# Patient Record
Sex: MALE | Race: WHITE | NOT HISPANIC OR LATINO | Employment: OTHER | URBAN - METROPOLITAN AREA
[De-identification: names, ages, dates, MRNs, and addresses within clinical notes are randomized per-mention and may not be internally consistent; named-entity substitution may affect disease eponyms.]

---

## 2017-05-22 ENCOUNTER — AMBULATORY - HEALTHEAST (OUTPATIENT)
Dept: INTERNAL MEDICINE | Facility: CLINIC | Age: 62
End: 2017-05-22

## 2017-05-22 ENCOUNTER — COMMUNICATION - HEALTHEAST (OUTPATIENT)
Dept: INTERNAL MEDICINE | Facility: CLINIC | Age: 62
End: 2017-05-22

## 2017-05-22 DIAGNOSIS — Z12.11 SCREEN FOR COLON CANCER: ICD-10-CM

## 2017-06-27 ENCOUNTER — RECORDS - HEALTHEAST (OUTPATIENT)
Dept: ADMINISTRATIVE | Facility: OTHER | Age: 62
End: 2017-06-27

## 2017-06-28 ENCOUNTER — RECORDS - HEALTHEAST (OUTPATIENT)
Dept: ADMINISTRATIVE | Facility: OTHER | Age: 62
End: 2017-06-28

## 2017-07-03 ENCOUNTER — COMMUNICATION - HEALTHEAST (OUTPATIENT)
Dept: INTERNAL MEDICINE | Facility: CLINIC | Age: 62
End: 2017-07-03

## 2017-07-03 ENCOUNTER — SURGERY - HEALTHEAST (OUTPATIENT)
Dept: GASTROENTEROLOGY | Facility: CLINIC | Age: 62
End: 2017-07-03

## 2017-07-03 ASSESSMENT — MIFFLIN-ST. JEOR
SCORE: 1544.12
SCORE: 1551.49

## 2017-07-04 ASSESSMENT — MIFFLIN-ST. JEOR: SCORE: 1560.57

## 2017-07-05 ENCOUNTER — COMMUNICATION - HEALTHEAST (OUTPATIENT)
Dept: INTERNAL MEDICINE | Facility: CLINIC | Age: 62
End: 2017-07-05

## 2017-07-10 ENCOUNTER — COMMUNICATION - HEALTHEAST (OUTPATIENT)
Dept: INTERNAL MEDICINE | Facility: CLINIC | Age: 62
End: 2017-07-10

## 2017-07-10 ENCOUNTER — OFFICE VISIT - HEALTHEAST (OUTPATIENT)
Dept: INTERNAL MEDICINE | Facility: CLINIC | Age: 62
End: 2017-07-10

## 2017-07-10 DIAGNOSIS — K92.2 LOWER GI BLEED: ICD-10-CM

## 2017-07-10 ASSESSMENT — MIFFLIN-ST. JEOR: SCORE: 1565.28

## 2017-08-14 ENCOUNTER — OFFICE VISIT - HEALTHEAST (OUTPATIENT)
Dept: INTERNAL MEDICINE | Facility: CLINIC | Age: 62
End: 2017-08-14

## 2017-08-14 ENCOUNTER — COMMUNICATION - HEALTHEAST (OUTPATIENT)
Dept: INTERNAL MEDICINE | Facility: CLINIC | Age: 62
End: 2017-08-14

## 2017-08-14 DIAGNOSIS — D62 ACUTE BLOOD LOSS ANEMIA: ICD-10-CM

## 2017-08-14 ASSESSMENT — MIFFLIN-ST. JEOR: SCORE: 1574.17

## 2017-08-22 ENCOUNTER — COMMUNICATION - HEALTHEAST (OUTPATIENT)
Dept: INTERNAL MEDICINE | Facility: CLINIC | Age: 62
End: 2017-08-22

## 2017-08-22 ENCOUNTER — AMBULATORY - HEALTHEAST (OUTPATIENT)
Dept: INTERNAL MEDICINE | Facility: CLINIC | Age: 62
End: 2017-08-22

## 2017-08-22 DIAGNOSIS — M54.9 BACK PAIN: ICD-10-CM

## 2017-08-28 ENCOUNTER — RECORDS - HEALTHEAST (OUTPATIENT)
Dept: ADMINISTRATIVE | Facility: OTHER | Age: 62
End: 2017-08-28

## 2017-08-28 ENCOUNTER — HOSPITAL ENCOUNTER (OUTPATIENT)
Dept: PHYSICAL MEDICINE AND REHAB | Facility: CLINIC | Age: 62
Discharge: HOME | End: 2017-08-28
Attending: INTERNAL MEDICINE

## 2017-08-28 DIAGNOSIS — Z98.890 HISTORY OF LUMBAR LAMINECTOMY: ICD-10-CM

## 2017-08-28 DIAGNOSIS — M54.50 LUMBALGIA: ICD-10-CM

## 2017-08-28 DIAGNOSIS — M79.18 MYOFASCIAL PAIN: ICD-10-CM

## 2017-08-28 ASSESSMENT — MIFFLIN-ST. JEOR: SCORE: 1571.91

## 2017-09-08 ENCOUNTER — RECORDS - HEALTHEAST (OUTPATIENT)
Dept: ADMINISTRATIVE | Facility: OTHER | Age: 62
End: 2017-09-08

## 2017-09-14 ENCOUNTER — OFFICE VISIT - HEALTHEAST (OUTPATIENT)
Dept: INTERNAL MEDICINE | Facility: CLINIC | Age: 62
End: 2017-09-14

## 2017-09-14 DIAGNOSIS — I10 ESSENTIAL HYPERTENSION WITH GOAL BLOOD PRESSURE LESS THAN 140/90: ICD-10-CM

## 2017-09-14 DIAGNOSIS — Z00.00 ROUTINE GENERAL MEDICAL EXAMINATION AT A HEALTH CARE FACILITY: ICD-10-CM

## 2017-09-14 DIAGNOSIS — R01.1 HEART MURMUR: ICD-10-CM

## 2017-09-14 LAB
CHOLEST SERPL-MCNC: 188 MG/DL
FASTING STATUS PATIENT QL REPORTED: YES
HDLC SERPL-MCNC: 62 MG/DL
LDLC SERPL CALC-MCNC: 117 MG/DL
PSA SERPL-MCNC: 3 NG/ML (ref 0–4.5)
TRIGL SERPL-MCNC: 45 MG/DL

## 2017-09-14 ASSESSMENT — MIFFLIN-ST. JEOR: SCORE: 1553.76

## 2017-09-15 ENCOUNTER — COMMUNICATION - HEALTHEAST (OUTPATIENT)
Dept: INTERNAL MEDICINE | Facility: CLINIC | Age: 62
End: 2017-09-15

## 2017-09-21 ENCOUNTER — OFFICE VISIT - HEALTHEAST (OUTPATIENT)
Dept: CARDIOLOGY | Facility: CLINIC | Age: 62
End: 2017-09-21

## 2017-09-21 DIAGNOSIS — I35.0 NONRHEUMATIC AORTIC VALVE STENOSIS: ICD-10-CM

## 2017-09-21 DIAGNOSIS — I10 HTN (HYPERTENSION): ICD-10-CM

## 2017-09-21 ASSESSMENT — MIFFLIN-ST. JEOR: SCORE: 1558.29

## 2017-09-28 ENCOUNTER — HOSPITAL ENCOUNTER (OUTPATIENT)
Dept: CARDIOLOGY | Facility: CLINIC | Age: 62
Discharge: HOME | End: 2017-09-28
Attending: INTERNAL MEDICINE

## 2017-09-28 DIAGNOSIS — I35.0 NONRHEUMATIC AORTIC VALVE STENOSIS: ICD-10-CM

## 2017-09-28 LAB
AORTIC ROOT: 3.2 CM
AORTIC VALVE MEAN VELOCITY: 152 CM/S
AV DIMENSIONLESS INDEX VTI: 0.6
AV MEAN GRADIENT: 10 MMHG
AV PEAK GRADIENT: 19.5 MMHG
AV VALVE AREA: 1.8 CM2
AV VELOCITY RATIO: 0.6
BSA FOR ECHO PROCEDURE: 1.95 M2
CV ECHO HEIGHT: 68 IN
CV ECHO WEIGHT: 175 LBS
DOP CALC AO PEAK VEL: 221 CM/S
DOP CALC AO VTI: 52.1 CM
DOP CALC LVOT AREA: 2.83 CM2
DOP CALC LVOT DIAMETER: 1.9 CM
DOP CALC LVOT PEAK VEL: 136 CM/S
DOP CALC LVOT STROKE VOLUME: 91.5 CM3
DOP CALCLVOT PEAK VEL VTI: 32.3 CM
ECHO EJECTION FRACTION ESTIMATED: 70 %
EJECTION FRACTION: 65 % (ref 55–75)
FRACTIONAL SHORTENING: 44.4 % (ref 28–44)
INTERVENTRICULAR SEPTUM IN END DIASTOLE: 1.2 CM (ref 0.6–1)
IVS/PW RATIO: 1
LA AREA 1: 18.2 CM2
LA AREA 2: 26 CM2
LEFT ATRIUM LENGTH: 4.78 CM
LEFT ATRIUM SIZE: 3.7 CM
LEFT ATRIUM TO AORTIC ROOT RATIO: 1.16 NO UNITS
LEFT ATRIUM VOLUME INDEX: 43.2 ML/M2
LEFT ATRIUM VOLUME: 84.1 CM3
LEFT VENTRICLE CARDIAC INDEX: 3.3 L/MIN/M2
LEFT VENTRICLE CARDIAC OUTPUT: 6.4 L/MIN
LEFT VENTRICLE DIASTOLIC VOLUME INDEX: 43.6 CM3/M2 (ref 34–74)
LEFT VENTRICLE DIASTOLIC VOLUME: 85 CM3 (ref 62–150)
LEFT VENTRICLE HEART RATE: 70 BPM
LEFT VENTRICLE MASS INDEX: 101.6 G/M2
LEFT VENTRICLE SYSTOLIC VOLUME INDEX: 15.4 CM3/M2 (ref 11–31)
LEFT VENTRICLE SYSTOLIC VOLUME: 30 CM3 (ref 21–61)
LEFT VENTRICULAR INTERNAL DIMENSION IN DIASTOLE: 4.5 CM (ref 4.2–5.8)
LEFT VENTRICULAR INTERNAL DIMENSION IN SYSTOLE: 2.5 CM (ref 2.5–4)
LEFT VENTRICULAR MASS: 198.1 G
LEFT VENTRICULAR OUTFLOW TRACT MEAN GRADIENT: 4 MMHG
LEFT VENTRICULAR OUTFLOW TRACT MEAN VELOCITY: 92.2 CM/S
LEFT VENTRICULAR OUTFLOW TRACT PEAK GRADIENT: 7 MMHG
LEFT VENTRICULAR POSTERIOR WALL IN END DIASTOLE: 1.2 CM (ref 0.6–1)
LV STROKE VOLUME INDEX: 46.9 ML/M2
MITRAL VALVE E/A RATIO: 0.9
MV AVERAGE E/E' RATIO: 10.7 CM/S
MV DECELERATION TIME: 190 MS
MV E'TISSUE VEL-LAT: 7.6 CM/S
MV E'TISSUE VEL-MED: 7.31 CM/S
MV LATERAL E/E' RATIO: 10.5
MV MEDIAL E/E' RATIO: 10.9
MV PEAK A VELOCITY: 92.3 CM/S
MV PEAK E VELOCITY: 79.5 CM/S
NUC REST DIASTOLIC VOLUME INDEX: 2800 LBS
NUC REST SYSTOLIC VOLUME INDEX: 68 IN
RIGHT VENTRICULAR INTERNAL DIMENSION IN DYSTOLE: 3.4 CM
TRICUSPID VALVE ANULAR PLANE SYSTOLIC EXCURSION: 2 CM

## 2017-09-28 ASSESSMENT — MIFFLIN-ST. JEOR: SCORE: 1558.29

## 2017-09-29 ENCOUNTER — COMMUNICATION - HEALTHEAST (OUTPATIENT)
Dept: CARDIOLOGY | Facility: CLINIC | Age: 62
End: 2017-09-29

## 2017-09-29 ENCOUNTER — COMMUNICATION - HEALTHEAST (OUTPATIENT)
Dept: INTERNAL MEDICINE | Facility: CLINIC | Age: 62
End: 2017-09-29

## 2017-10-16 ENCOUNTER — OFFICE VISIT - HEALTHEAST (OUTPATIENT)
Dept: INTERNAL MEDICINE | Facility: CLINIC | Age: 62
End: 2017-10-16

## 2017-10-16 DIAGNOSIS — I10 ESSENTIAL HYPERTENSION: ICD-10-CM

## 2017-12-21 ENCOUNTER — OFFICE VISIT - HEALTHEAST (OUTPATIENT)
Dept: INTERNAL MEDICINE | Facility: CLINIC | Age: 62
End: 2017-12-21

## 2017-12-21 DIAGNOSIS — I10 ESSENTIAL HYPERTENSION: ICD-10-CM

## 2017-12-21 ASSESSMENT — MIFFLIN-ST. JEOR: SCORE: 1567.37

## 2018-01-22 ENCOUNTER — RECORDS - HEALTHEAST (OUTPATIENT)
Dept: ADMINISTRATIVE | Facility: OTHER | Age: 63
End: 2018-01-22

## 2018-07-10 ENCOUNTER — COMMUNICATION - HEALTHEAST (OUTPATIENT)
Dept: INTERNAL MEDICINE | Facility: CLINIC | Age: 63
End: 2018-07-10

## 2018-07-10 ENCOUNTER — OFFICE VISIT - HEALTHEAST (OUTPATIENT)
Dept: INTERNAL MEDICINE | Facility: CLINIC | Age: 63
End: 2018-07-10

## 2018-07-10 ENCOUNTER — HOSPITAL ENCOUNTER (OUTPATIENT)
Dept: MRI IMAGING | Facility: CLINIC | Age: 63
Discharge: HOME | End: 2018-07-10
Attending: INTERNAL MEDICINE

## 2018-07-10 DIAGNOSIS — R42 VERTIGO: ICD-10-CM

## 2018-07-10 DIAGNOSIS — I10 ESSENTIAL HYPERTENSION, BENIGN: ICD-10-CM

## 2018-07-10 DIAGNOSIS — G47.00 INSOMNIA: ICD-10-CM

## 2018-07-10 DIAGNOSIS — F43.21 GRIEF: ICD-10-CM

## 2018-07-10 DIAGNOSIS — Z79.899 HIGH RISK MEDICATION USE: ICD-10-CM

## 2018-07-10 DIAGNOSIS — I10 HTN (HYPERTENSION): ICD-10-CM

## 2018-07-10 LAB
ALBUMIN UR-MCNC: NEGATIVE MG/DL
ANION GAP SERPL CALCULATED.3IONS-SCNC: 12 MMOL/L (ref 5–18)
APPEARANCE UR: CLEAR
BILIRUB UR QL STRIP: NEGATIVE
BUN SERPL-MCNC: 18 MG/DL (ref 8–22)
CALCIUM SERPL-MCNC: 10 MG/DL (ref 8.5–10.5)
CHLORIDE BLD-SCNC: 107 MMOL/L (ref 98–107)
CO2 SERPL-SCNC: 24 MMOL/L (ref 22–31)
COLOR UR AUTO: YELLOW
CREAT SERPL-MCNC: 0.78 MG/DL (ref 0.7–1.3)
GFR SERPL CREATININE-BSD FRML MDRD: >60 ML/MIN/1.73M2
GLUCOSE BLD-MCNC: 82 MG/DL (ref 70–125)
GLUCOSE UR STRIP-MCNC: NEGATIVE MG/DL
HGB UR QL STRIP: NEGATIVE
KETONES UR STRIP-MCNC: NEGATIVE MG/DL
LEUKOCYTE ESTERASE UR QL STRIP: NEGATIVE
NITRATE UR QL: NEGATIVE
PH UR STRIP: 6.5 [PH] (ref 5–8)
POTASSIUM BLD-SCNC: 4.3 MMOL/L (ref 3.5–5)
SODIUM SERPL-SCNC: 143 MMOL/L (ref 136–145)
SP GR UR STRIP: 1.02 (ref 1–1.03)
UROBILINOGEN UR STRIP-ACNC: NORMAL

## 2018-07-10 ASSESSMENT — MIFFLIN-ST. JEOR: SCORE: 1562.83

## 2018-07-11 ENCOUNTER — COMMUNICATION - HEALTHEAST (OUTPATIENT)
Dept: INTERNAL MEDICINE | Facility: CLINIC | Age: 63
End: 2018-07-11

## 2018-07-16 ENCOUNTER — OFFICE VISIT - HEALTHEAST (OUTPATIENT)
Dept: INTERNAL MEDICINE | Facility: CLINIC | Age: 63
End: 2018-07-16

## 2018-07-16 DIAGNOSIS — I10 ESSENTIAL HYPERTENSION: ICD-10-CM

## 2018-07-16 ASSESSMENT — MIFFLIN-ST. JEOR: SCORE: 1544.69

## 2018-08-01 ENCOUNTER — COMMUNICATION - HEALTHEAST (OUTPATIENT)
Dept: INTERNAL MEDICINE | Facility: CLINIC | Age: 63
End: 2018-08-01

## 2018-08-01 DIAGNOSIS — I10 HTN (HYPERTENSION): ICD-10-CM

## 2018-08-17 ENCOUNTER — COMMUNICATION - HEALTHEAST (OUTPATIENT)
Dept: INTERNAL MEDICINE | Facility: CLINIC | Age: 63
End: 2018-08-17

## 2018-08-17 DIAGNOSIS — I10 HTN (HYPERTENSION): ICD-10-CM

## 2018-08-20 ENCOUNTER — OFFICE VISIT - HEALTHEAST (OUTPATIENT)
Dept: INTERNAL MEDICINE | Facility: CLINIC | Age: 63
End: 2018-08-20

## 2018-08-20 DIAGNOSIS — I10 ESSENTIAL HYPERTENSION: ICD-10-CM

## 2018-08-20 ASSESSMENT — MIFFLIN-ST. JEOR: SCORE: 1549.22

## 2018-09-14 ENCOUNTER — COMMUNICATION - HEALTHEAST (OUTPATIENT)
Dept: GENERAL RADIOLOGY | Facility: CLINIC | Age: 63
End: 2018-09-14

## 2018-09-17 ENCOUNTER — OFFICE VISIT - HEALTHEAST (OUTPATIENT)
Dept: INTERNAL MEDICINE | Facility: CLINIC | Age: 63
End: 2018-09-17

## 2018-09-17 DIAGNOSIS — Z00.00 ROUTINE GENERAL MEDICAL EXAMINATION AT A HEALTH CARE FACILITY: ICD-10-CM

## 2018-09-17 LAB
ALBUMIN SERPL-MCNC: 4.1 G/DL (ref 3.5–5)
ALBUMIN UR-MCNC: NEGATIVE MG/DL
ALP SERPL-CCNC: 73 U/L (ref 45–120)
ALT SERPL W P-5'-P-CCNC: 25 U/L (ref 0–45)
ANION GAP SERPL CALCULATED.3IONS-SCNC: 8 MMOL/L (ref 5–18)
APPEARANCE UR: ABNORMAL
AST SERPL W P-5'-P-CCNC: 21 U/L (ref 0–40)
BILIRUB SERPL-MCNC: 1.4 MG/DL (ref 0–1)
BILIRUB UR QL STRIP: NEGATIVE
BUN SERPL-MCNC: 16 MG/DL (ref 8–22)
CALCIUM SERPL-MCNC: 10 MG/DL (ref 8.5–10.5)
CHLORIDE BLD-SCNC: 104 MMOL/L (ref 98–107)
CHOLEST SERPL-MCNC: 188 MG/DL
CO2 SERPL-SCNC: 27 MMOL/L (ref 22–31)
COLOR UR AUTO: YELLOW
CREAT SERPL-MCNC: 0.79 MG/DL (ref 0.7–1.3)
ERYTHROCYTE [DISTWIDTH] IN BLOOD BY AUTOMATED COUNT: 11.6 % (ref 11–14.5)
FASTING STATUS PATIENT QL REPORTED: YES
GFR SERPL CREATININE-BSD FRML MDRD: >60 ML/MIN/1.73M2
GLUCOSE BLD-MCNC: 94 MG/DL (ref 70–125)
GLUCOSE UR STRIP-MCNC: NEGATIVE MG/DL
HCT VFR BLD AUTO: 44.6 % (ref 40–54)
HDLC SERPL-MCNC: 65 MG/DL
HGB BLD-MCNC: 15.3 G/DL (ref 14–18)
HGB UR QL STRIP: NEGATIVE
KETONES UR STRIP-MCNC: NEGATIVE MG/DL
LDLC SERPL CALC-MCNC: 111 MG/DL
LEUKOCYTE ESTERASE UR QL STRIP: NEGATIVE
MCH RBC QN AUTO: 33.1 PG (ref 27–34)
MCHC RBC AUTO-ENTMCNC: 34.3 G/DL (ref 32–36)
MCV RBC AUTO: 97 FL (ref 80–100)
NITRATE UR QL: NEGATIVE
PH UR STRIP: 7.5 [PH] (ref 5–8)
PLATELET # BLD AUTO: 158 THOU/UL (ref 140–440)
PMV BLD AUTO: 7.8 FL (ref 7–10)
POTASSIUM BLD-SCNC: 4.1 MMOL/L (ref 3.5–5)
PROT SERPL-MCNC: 7.2 G/DL (ref 6–8)
PSA SERPL-MCNC: 2.2 NG/ML (ref 0–4.5)
RBC # BLD AUTO: 4.62 MILL/UL (ref 4.4–6.2)
SODIUM SERPL-SCNC: 139 MMOL/L (ref 136–145)
SP GR UR STRIP: 1.01 (ref 1–1.03)
TRIGL SERPL-MCNC: 59 MG/DL
TSH SERPL DL<=0.005 MIU/L-ACNC: 1.15 UIU/ML (ref 0.3–5)
UROBILINOGEN UR STRIP-ACNC: ABNORMAL
WBC: 4.2 THOU/UL (ref 4–11)

## 2018-09-17 ASSESSMENT — MIFFLIN-ST. JEOR: SCORE: 1549.22

## 2018-09-18 ENCOUNTER — COMMUNICATION - HEALTHEAST (OUTPATIENT)
Dept: INTERNAL MEDICINE | Facility: CLINIC | Age: 63
End: 2018-09-18

## 2018-12-31 ENCOUNTER — COMMUNICATION - HEALTHEAST (OUTPATIENT)
Dept: INTERNAL MEDICINE | Facility: CLINIC | Age: 63
End: 2018-12-31

## 2019-03-01 ENCOUNTER — COMMUNICATION - HEALTHEAST (OUTPATIENT)
Dept: INTERNAL MEDICINE | Facility: CLINIC | Age: 64
End: 2019-03-01

## 2019-11-13 ENCOUNTER — COMMUNICATION - HEALTHEAST (OUTPATIENT)
Dept: INTERNAL MEDICINE | Facility: CLINIC | Age: 64
End: 2019-11-13

## 2019-11-13 DIAGNOSIS — I10 HTN (HYPERTENSION): ICD-10-CM

## 2019-12-30 ENCOUNTER — OFFICE VISIT - HEALTHEAST (OUTPATIENT)
Dept: INTERNAL MEDICINE | Facility: CLINIC | Age: 64
End: 2019-12-30

## 2019-12-30 DIAGNOSIS — Z00.00 ROUTINE GENERAL MEDICAL EXAMINATION AT A HEALTH CARE FACILITY: ICD-10-CM

## 2019-12-30 LAB
ALBUMIN SERPL-MCNC: 3.9 G/DL (ref 3.5–5)
ALBUMIN UR-MCNC: NEGATIVE MG/DL
ALP SERPL-CCNC: 97 U/L (ref 45–120)
ALT SERPL W P-5'-P-CCNC: 36 U/L (ref 0–45)
ANION GAP SERPL CALCULATED.3IONS-SCNC: 9 MMOL/L (ref 5–18)
APPEARANCE UR: CLEAR
AST SERPL W P-5'-P-CCNC: 28 U/L (ref 0–40)
BILIRUB SERPL-MCNC: 0.6 MG/DL (ref 0–1)
BILIRUB UR QL STRIP: NEGATIVE
BUN SERPL-MCNC: 18 MG/DL (ref 8–22)
CALCIUM SERPL-MCNC: 9.4 MG/DL (ref 8.5–10.5)
CHLORIDE BLD-SCNC: 104 MMOL/L (ref 98–107)
CHOLEST SERPL-MCNC: 158 MG/DL
CO2 SERPL-SCNC: 26 MMOL/L (ref 22–31)
COLOR UR AUTO: YELLOW
CREAT SERPL-MCNC: 0.74 MG/DL (ref 0.7–1.3)
ERYTHROCYTE [DISTWIDTH] IN BLOOD BY AUTOMATED COUNT: 11.2 % (ref 11–14.5)
FASTING STATUS PATIENT QL REPORTED: NORMAL
GFR SERPL CREATININE-BSD FRML MDRD: >60 ML/MIN/1.73M2
GLUCOSE BLD-MCNC: 90 MG/DL (ref 70–125)
GLUCOSE UR STRIP-MCNC: NEGATIVE MG/DL
HCT VFR BLD AUTO: 43.4 % (ref 40–54)
HDLC SERPL-MCNC: 41 MG/DL
HGB BLD-MCNC: 15.1 G/DL (ref 14–18)
HGB UR QL STRIP: NEGATIVE
KETONES UR STRIP-MCNC: NEGATIVE MG/DL
LDLC SERPL CALC-MCNC: 99 MG/DL
LEUKOCYTE ESTERASE UR QL STRIP: NEGATIVE
MCH RBC QN AUTO: 33.7 PG (ref 27–34)
MCHC RBC AUTO-ENTMCNC: 34.7 G/DL (ref 32–36)
MCV RBC AUTO: 97 FL (ref 80–100)
NITRATE UR QL: NEGATIVE
PH UR STRIP: 6 [PH] (ref 4.5–8)
PLATELET # BLD AUTO: 157 THOU/UL (ref 140–440)
PMV BLD AUTO: 7.8 FL (ref 7–10)
POTASSIUM BLD-SCNC: 4.4 MMOL/L (ref 3.5–5)
PROT SERPL-MCNC: 6.9 G/DL (ref 6–8)
PSA SERPL-MCNC: 3.4 NG/ML (ref 0–4.5)
RBC # BLD AUTO: 4.48 MILL/UL (ref 4.4–6.2)
SODIUM SERPL-SCNC: 139 MMOL/L (ref 136–145)
SP GR UR STRIP: 1.02 (ref 1–1.03)
TRIGL SERPL-MCNC: 92 MG/DL
TSH SERPL DL<=0.005 MIU/L-ACNC: 1.43 UIU/ML (ref 0.3–5)
UROBILINOGEN UR STRIP-ACNC: NORMAL
WBC: 5.6 THOU/UL (ref 4–11)

## 2019-12-30 ASSESSMENT — MIFFLIN-ST. JEOR: SCORE: 1518.04

## 2019-12-31 ENCOUNTER — COMMUNICATION - HEALTHEAST (OUTPATIENT)
Dept: INTERNAL MEDICINE | Facility: CLINIC | Age: 64
End: 2019-12-31

## 2020-01-21 ENCOUNTER — RECORDS - HEALTHEAST (OUTPATIENT)
Dept: ADMINISTRATIVE | Facility: OTHER | Age: 65
End: 2020-01-21

## 2020-02-10 ENCOUNTER — COMMUNICATION - HEALTHEAST (OUTPATIENT)
Dept: INTERNAL MEDICINE | Facility: CLINIC | Age: 65
End: 2020-02-10

## 2020-02-10 DIAGNOSIS — I10 HTN (HYPERTENSION): ICD-10-CM

## 2020-02-13 ENCOUNTER — RECORDS - HEALTHEAST (OUTPATIENT)
Dept: ADMINISTRATIVE | Facility: OTHER | Age: 65
End: 2020-02-13

## 2020-06-29 ENCOUNTER — RECORDS - HEALTHEAST (OUTPATIENT)
Dept: ADMINISTRATIVE | Facility: OTHER | Age: 65
End: 2020-06-29

## 2021-04-20 ENCOUNTER — OFFICE VISIT - HEALTHEAST (OUTPATIENT)
Dept: INTERNAL MEDICINE | Facility: CLINIC | Age: 66
End: 2021-04-20

## 2021-04-20 DIAGNOSIS — Z00.00 ROUTINE GENERAL MEDICAL EXAMINATION AT A HEALTH CARE FACILITY: ICD-10-CM

## 2021-04-20 LAB
ALBUMIN SERPL-MCNC: 4.2 G/DL (ref 3.5–5)
ALBUMIN UR-MCNC: NEGATIVE G/DL
ALP SERPL-CCNC: 84 U/L (ref 45–120)
ALT SERPL W P-5'-P-CCNC: 17 U/L (ref 0–45)
ANION GAP SERPL CALCULATED.3IONS-SCNC: 10 MMOL/L (ref 5–18)
APPEARANCE UR: CLEAR
AST SERPL W P-5'-P-CCNC: 19 U/L (ref 0–40)
BILIRUB SERPL-MCNC: 0.7 MG/DL (ref 0–1)
BILIRUB UR QL STRIP: NEGATIVE
BUN SERPL-MCNC: 20 MG/DL (ref 8–22)
CALCIUM SERPL-MCNC: 9.7 MG/DL (ref 8.5–10.5)
CHLORIDE BLD-SCNC: 102 MMOL/L (ref 98–107)
CHOLEST SERPL-MCNC: 210 MG/DL
CO2 SERPL-SCNC: 27 MMOL/L (ref 22–31)
COLOR UR AUTO: YELLOW
CREAT SERPL-MCNC: 0.78 MG/DL (ref 0.7–1.3)
ERYTHROCYTE [DISTWIDTH] IN BLOOD BY AUTOMATED COUNT: 11.7 % (ref 11–14.5)
FASTING STATUS PATIENT QL REPORTED: YES
GFR SERPL CREATININE-BSD FRML MDRD: >60 ML/MIN/1.73M2
GLUCOSE BLD-MCNC: 96 MG/DL (ref 70–125)
GLUCOSE UR STRIP-MCNC: NEGATIVE MG/DL
HCT VFR BLD AUTO: 42.9 % (ref 40–54)
HDLC SERPL-MCNC: 59 MG/DL
HGB BLD-MCNC: 15 G/DL (ref 14–18)
HGB UR QL STRIP: NEGATIVE
KETONES UR STRIP-MCNC: NEGATIVE MG/DL
LDLC SERPL CALC-MCNC: 136 MG/DL
LEUKOCYTE ESTERASE UR QL STRIP: NEGATIVE
MCH RBC QN AUTO: 32.7 PG (ref 27–34)
MCHC RBC AUTO-ENTMCNC: 35 G/DL (ref 32–36)
MCV RBC AUTO: 94 FL (ref 80–100)
NITRATE UR QL: NEGATIVE
PH UR STRIP: 7 [PH] (ref 5–8)
PLATELET # BLD AUTO: 173 THOU/UL (ref 140–440)
PMV BLD AUTO: 9.6 FL (ref 7–10)
POTASSIUM BLD-SCNC: 4.5 MMOL/L (ref 3.5–5)
PROT SERPL-MCNC: 7.2 G/DL (ref 6–8)
PSA SERPL-MCNC: 3.3 NG/ML (ref 0–4.5)
RBC # BLD AUTO: 4.59 MILL/UL (ref 4.4–6.2)
SODIUM SERPL-SCNC: 139 MMOL/L (ref 136–145)
SP GR UR STRIP: 1.02 (ref 1–1.03)
TRIGL SERPL-MCNC: 74 MG/DL
TSH SERPL DL<=0.005 MIU/L-ACNC: 1.1 UIU/ML (ref 0.3–5)
UROBILINOGEN UR STRIP-ACNC: NORMAL
WBC: 4.7 THOU/UL (ref 4–11)

## 2021-04-20 ASSESSMENT — MIFFLIN-ST. JEOR: SCORE: 1536.18

## 2021-04-22 ENCOUNTER — COMMUNICATION - HEALTHEAST (OUTPATIENT)
Dept: INTERNAL MEDICINE | Facility: CLINIC | Age: 66
End: 2021-04-22

## 2021-04-22 DIAGNOSIS — E88.810 METABOLIC SYNDROME: ICD-10-CM

## 2021-05-10 ENCOUNTER — RECORDS - HEALTHEAST (OUTPATIENT)
Dept: ADMINISTRATIVE | Facility: OTHER | Age: 66
End: 2021-05-10

## 2021-05-10 ENCOUNTER — COMMUNICATION - HEALTHEAST (OUTPATIENT)
Dept: INTERNAL MEDICINE | Facility: CLINIC | Age: 66
End: 2021-05-10

## 2021-05-10 DIAGNOSIS — I10 HTN (HYPERTENSION): ICD-10-CM

## 2021-05-26 ENCOUNTER — RECORDS - HEALTHEAST (OUTPATIENT)
Dept: ADMINISTRATIVE | Facility: CLINIC | Age: 66
End: 2021-05-26

## 2021-05-31 VITALS — BODY MASS INDEX: 26.52 KG/M2 | HEIGHT: 68 IN | WEIGHT: 175 LBS

## 2021-05-31 VITALS — BODY MASS INDEX: 25.92 KG/M2 | HEIGHT: 69 IN | WEIGHT: 175 LBS

## 2021-05-31 VITALS — BODY MASS INDEX: 26.37 KG/M2 | WEIGHT: 174 LBS | HEIGHT: 68 IN

## 2021-05-31 VITALS — HEIGHT: 69 IN | BODY MASS INDEX: 25.84 KG/M2 | WEIGHT: 174.5 LBS

## 2021-05-31 VITALS — WEIGHT: 177 LBS | HEIGHT: 68 IN | BODY MASS INDEX: 26.83 KG/M2

## 2021-05-31 VITALS — WEIGHT: 173.04 LBS | HEIGHT: 69 IN | BODY MASS INDEX: 25.63 KG/M2

## 2021-05-31 VITALS — HEIGHT: 68 IN | WEIGHT: 175 LBS | BODY MASS INDEX: 26.52 KG/M2

## 2021-05-31 VITALS — WEIGHT: 172 LBS | BODY MASS INDEX: 25.48 KG/M2 | HEIGHT: 69 IN

## 2021-06-01 VITALS — BODY MASS INDEX: 26.22 KG/M2 | HEIGHT: 68 IN | WEIGHT: 173 LBS

## 2021-06-01 VITALS — WEIGHT: 172 LBS | BODY MASS INDEX: 26.07 KG/M2 | HEIGHT: 68 IN

## 2021-06-01 VITALS — WEIGHT: 176 LBS | BODY MASS INDEX: 26.67 KG/M2 | HEIGHT: 68 IN

## 2021-06-01 VITALS — WEIGHT: 173 LBS | HEIGHT: 68 IN | BODY MASS INDEX: 26.22 KG/M2

## 2021-06-03 VITALS
OXYGEN SATURATION: 98 % | WEIGHT: 167 LBS | DIASTOLIC BLOOD PRESSURE: 70 MMHG | HEART RATE: 74 BPM | SYSTOLIC BLOOD PRESSURE: 120 MMHG | HEIGHT: 68 IN | BODY MASS INDEX: 25.31 KG/M2

## 2021-06-03 NOTE — TELEPHONE ENCOUNTER
Refill NOT Given  Refill NOT Given> 15 months since last office visit  Refill given per Policy, patient informed they are overdue for Labs and Office Visit   OV 9/17/18    Joann Chaparro, [facility] Connection Triage/Med Refill 11/13/2019    Requested Prescriptions   Pending Prescriptions Disp Refills     lisinopril (PRINIVIL,ZESTRIL) 10 MG tablet [Pharmacy Med Name: LISINOPRIL 10MG TABLETS] 180 tablet 0     Sig: TAKE 2 TABLETS(20 MG) BY MOUTH DAILY       Ace Inhibitors Refill Protocol Failed - 11/13/2019  3:56 PM        Failed - PCP or prescribing provider visit in past 12 months       Last office visit with prescriber/PCP: 8/20/2018 Vinh Rdz MD OR same dept: Visit date not found OR same specialty: 8/20/2018 Vinh Rdz MD  Last physical: 9/17/2018 Last MTM visit: Visit date not found   Next visit within 3 mo: Visit date not found  Next physical within 3 mo: Visit date not found  Prescriber OR PCP: Vinh Rdz MD  Last diagnosis associated with med order: 1. HTN (hypertension)  - lisinopril (PRINIVIL,ZESTRIL) 10 MG tablet [Pharmacy Med Name: LISINOPRIL 10MG TABLETS]; TAKE 2 TABLETS(20 MG) BY MOUTH DAILY  Dispense: 180 tablet; Refill: 0    If protocol passes may refill for 12 months if within 3 months of last provider visit (or a total of 15 months).             Failed - Serum Potassium in past 12 months     No results found for: LN-POTASSIUM          Failed - Blood pressure filed in past 12 months     BP Readings from Last 1 Encounters:   09/17/18 138/72             Failed - Serum Creatinine in past 12 months     Creatinine   Date Value Ref Range Status   09/17/2018 0.79 0.70 - 1.30 mg/dL Final

## 2021-06-04 NOTE — PROGRESS NOTES
Office Visit - Physical    Yayo Larkin   64 y.o. male    Date of Visit: 12/30/2019    Chief Complaint   Patient presents with     Annual Exam     fasting       Subjective: Physical examination.    64-year-old semiretired  here for physical exam.    Colonoscopy dated June 27, 2017 showed a tubular adenomatous colon polyp.  Repeat colonoscopy slated for 2020.    No known drug allergies.    Non-smoker no excess alcohol.    Deviously seen at the [place] [facility] for eye irritation resolved.    ROS: A comprehensive review of systems was performed and was otherwise negative    Medications:   Prior to Admission medications    Medication Sig Start Date End Date Taking? Authorizing Provider   aspirin 81 mg chewable tablet Chew 81 mg daily.   Yes PROVIDER, HISTORICAL   DOCOSAHEXANOIC ACID/EPA (FISH OIL ORAL) Take 1,200 mg by mouth daily.   Yes PROVIDER, HISTORICAL   glucosamine-chondroitin 500-400 mg cap Take 1 capsule by mouth daily.   Yes PROVIDER, HISTORICAL   lisinopril (PRINIVIL,ZESTRIL) 10 MG tablet TAKE 2 TABLETS(20 MG) BY MOUTH DAILY 11/14/19  Yes Vinh Rdz MD   multivitamin with minerals (THERA-M) 9 mg iron-400 mcg Tab tablet Take 1 tablet by mouth daily.   Yes PROVIDER, HISTORICAL       Allergies:No Known Allergies    Immunizations:   Immunization History   Administered Date(s) Administered     DT (pediatric) 07/11/2005     Influenza, inj, historic,unspecified 10/29/2008, 11/24/2014     Influenza,seasonal quad, PF, =/> 6months 10/16/2017     Td,adult,historic,unspecified 07/11/2005     Tdap 02/25/2010     ZOSTER, LIVE 10/09/2017       Health Maintenance: Immunizations reviewed and up-to-date.    Shingles vaccine has been recommended along with flu shot.    Past Medical History: Malignant melanoma resected no sign of recurrence.    History of lumbar laminectomy for free fragment.    Past Surgical History: As above no sign of recurrence from malignant melanoma no anesthetic problems  with any prior surgery.    Family History: Mother living in her late 80s.    Father  in his mid 50s myocardial infarction.  One sister  in her 20s of rhabdomyosarcoma.  Wife  about 2 years ago with pneumococcal sepsis.  2017.  Patient has a female friend.  Semi-retired now.  Anticipates going to [place] for the [employer] [place] game.    Social History: See above    Exam Chest clear to auscultation and percussion.  Heart tones regular rhythm without murmur rub or gallop.  Abdomen soft nontender no organomegaly.  No peritoneal signs.  Extremities free of edema cyanosis or clubbing.  Neck veins nondistended no thyromegaly or scleral icterus noted, carotids full.  Skin warm and dry easily conversant good spirited.  Normal intelligence.  Neurologically intact no gross localizing findings.  Skin negative lymph negative neuro negative psych normal male pattern baldness easily conversant excellent neuromuscular tone the patient was a former football and .  Genital rectal exam negative good pulse noted all 4 extremities no carotid bruits.    Assessment and Plan  General medical examination at healthcare facility check hemogram plus comprehensive metabolic profile urinalysis lipid panel PSA TSH.        Vinh Rdz MD    Patient Active Problem List   Diagnosis     Lumbago     Mixed hyperlipidemia     Lower GI bleed     S/P colonoscopic polypectomy with possible polypectomy bleed     Acute blood loss anemia     Essential hypertension, benign

## 2021-06-05 VITALS
WEIGHT: 171 LBS | DIASTOLIC BLOOD PRESSURE: 74 MMHG | BODY MASS INDEX: 25.91 KG/M2 | TEMPERATURE: 97.9 F | OXYGEN SATURATION: 99 % | HEIGHT: 68 IN | HEART RATE: 67 BPM | SYSTOLIC BLOOD PRESSURE: 130 MMHG

## 2021-06-06 NOTE — TELEPHONE ENCOUNTER
Who is calling:  Yayo  Reason for Call:  Patient is calling to check on status of refill request for 2/10/20.   Date of last appointment with primary care: 12/30/19  Okay to leave a detailed message: No need to call back.

## 2021-06-06 NOTE — TELEPHONE ENCOUNTER
Refill Approved    Rx renewed per Medication Renewal Policy. Medication was last renewed on 12.30.19.    Justina Mckoy, [facility] Connection Triage/Med Refill 2/13/2020     Requested Prescriptions   Pending Prescriptions Disp Refills     lisinopril (PRINIVIL,ZESTRIL) 10 MG tablet [Pharmacy Med Name: LISINOPRIL 10MG TABLETS] 180 tablet 0     Sig: TAKE 2 TABLETS(20 MG) BY MOUTH DAILY       Ace Inhibitors Refill Protocol Passed - 2/13/2020  8:36 AM        Passed - PCP or prescribing provider visit in past 12 months       Last office visit with prescriber/PCP: 8/20/2018 Vinh Rdz MD OR same dept: Visit date not found OR same specialty: 8/20/2018 Vinh Rdz MD  Last physical: 12/30/2019 Last MTM visit: Visit date not found   Next visit within 3 mo: Visit date not found  Next physical within 3 mo: Visit date not found  Prescriber OR PCP: Vinh Rdz MD  Last diagnosis associated with med order: 1. HTN (hypertension)  - lisinopril (PRINIVIL,ZESTRIL) 10 MG tablet [Pharmacy Med Name: LISINOPRIL 10MG TABLETS]; TAKE 2 TABLETS(20 MG) BY MOUTH DAILY  Dispense: 180 tablet; Refill: 0    If protocol passes may refill for 12 months if within 3 months of last provider visit (or a total of 15 months).             Passed - Serum Potassium in past 12 months     Lab Results   Component Value Date    Potassium 4.4 12/30/2019             Passed - Blood pressure filed in past 12 months     BP Readings from Last 1 Encounters:   12/30/19 120/70             Passed - Serum Creatinine in past 12 months     Creatinine   Date Value Ref Range Status   12/30/2019 0.74 0.70 - 1.30 mg/dL Final

## 2021-06-08 ENCOUNTER — RECORDS - HEALTHEAST (OUTPATIENT)
Dept: ADMINISTRATIVE | Facility: OTHER | Age: 66
End: 2021-06-08

## 2021-06-12 NOTE — PROGRESS NOTES
Office Visit - Follow up    Yayo Larkin   61 y.o. male    Date of Visit: 8/14/2017    Chief Complaint   Patient presents with     GI Bleeding     follow up     Back Pain     lwer seen at [facility]  yesterday        Subjective: Acute lower GI bleed.  Follow-up hemoglobin last on 8.9 on July 10, 2017 here today for recheck.    Low back pain went to triage yesterday orthopedic.  Given cyclobenzaprine 10 mg plus Medrol Dosepak.  Recheck hemoglobin levels pending.  No more hematochezia.  No blood in stool or urine no melena.    Denies chest pain or shortness of breath.  Medication list reviewed generally well-tolerated.    Last colonoscopy per Dr. Baker of [facility] on July on June 27, 2017 showed a tubular adenoma consistent with advanced adenoma follow-up suggested for colonoscopy 3 years time subsequent to this polypectomy the patient had acute lower GI bleed.    ROS: A comprehensive review of systems was performed and was otherwise negative    Medications:  Prior to Admission medications    Medication Sig Start Date End Date Taking? Authorizing Provider   ascorbic acid, vitamin C, (VITAMIN C) 500 MG tablet Take 500 mg by mouth daily.   Yes PROVIDER, HISTORICAL   aspirin 81 mg chewable tablet Chew 81 mg daily.   Yes PROVIDER, HISTORICAL   cholecalciferol, vitamin D3, (VITAMIN D3) 1,000 unit capsule Take 1,000 Units by mouth daily.   Yes PROVIDER, HISTORICAL   cyclobenzaprine (FLEXERIL) 10 MG tablet Take 10 mg by mouth. 8/13/17  Yes PROVIDER, HISTORICAL   DOCOSAHEXANOIC ACID/EPA (FISH OIL ORAL) Take 1,200 mg by mouth daily.   Yes PROVIDER, HISTORICAL   methylPREDNISolone (MEDROL DOSEPACK) 4 mg tablet  8/13/17  Yes PROVIDER, HISTORICAL   multivitamin with minerals (THERA-M) 9 mg iron-400 mcg Tab tablet Take 1 tablet by mouth daily.   Yes PROVIDER, HISTORICAL       Allergies: No Known Allergies    Immunizations:   Immunization History   Administered Date(s) Administered     DT (pediatric) 07/11/2005     Influenza,  inj, historic 10/29/2008     Td, historic 07/11/2005     Tdap 02/25/2010       Exam Chest clear to auscultation and percussion.  Heart tones regular rhythm without murmur rub or gallop.  Abdomen soft nontender no organomegaly.  No peritoneal signs.  Extremities free of edema cyanosis or clubbing.  Neck veins nondistended no thyromegaly or scleral icterus noted, carotids full.  Skin warm and dry easily conversant good spirited.  Normal intelligence.  Neurologically intact no gross localizing findings.  Slightly stooped in posture bent forward.  Lack of normal lordosis.    Assessment and Plan  Lower GI bleed after polypectomy for colon polyp tubular adenomatous in type advanced.  Check hemoglobin level today.  No further hematochezia.    Low back pain acute flareup sports\golf related.    Overweight BMI 26.  Discussed see below.  RTC 1 month for full physical examination with recheck hemoglobin.  Discussed iron rich diet.    Time: total time spent with the patient was 25 minutes of which >50% was spent in counseling and coordination of care    The following high BMI interventions were performed this visit: encouragement to exercise    Vinh Rdz MD    Patient Active Problem List   Diagnosis     Lumbago     Olecranon Bursitis     Mixed hyperlipidemia     General medical exam     Lower GI bleed     S/P colonoscopic polypectomy with possible polypectomy bleed     Acute blood loss anemia

## 2021-06-12 NOTE — PROGRESS NOTES
ASSESSMENT:     Diagnoses and all orders for this visit:    Lumbalgia  -     Ambulatory referral to Physical Therapy    Myofascial pain  -     Ambulatory referral to Physical Therapy    History of lumbar laminectomy  -     Ambulatory referral to Physical Therapy            Yayo Larkin is a 61 y.o. male  with a BMI of Body mass index is 25.77 kg/(m^2). who presents today in consultation at the request of Dr. Rdz, Vinh THOMAS MD  for new patient evaluation of right-sided mid to low back pain at the left L3-L4 level.  Patient symptoms are consistent with myofascial pain.  Patient symptoms are without radiation proximally or distally.  Patient symptoms started approximately 2 weeks ago without preceding trauma.  Patient has a history of right L3-L4 laminectomy back in 2007.  Patient symptoms responded well to  Medrol Dosepak and muscle relaxant and taking Advil as needed for pain.  I recommend patient to start in physical therapy, and to continue with the muscle relaxer as needed.  If his symptoms reappears and worsens, we will consider lumbar MRI imaging.  CT abdomen pelvis that was done on July 30, 2017 shows no concerning pathology except for the small amount of active GI bleed that was addressed in his recent hospitalization.  No red flags.          IMELDA:  24  WHO-5:  18    PLAN:  A shared decision making model was used.  The patient's values and choices were respected.  The following represents what was discussed and decided upon by the physician and the patient.      1.  DIAGNOSTIC TESTS: I reviewed the CT abdomen pelvis imaging and the report with the patient today.  2.  PHYSICAL THERAPY:  Discussed the importance of core strengthening, ROM, stretching exercises with the patient and how each of these entities is important in decreasing pain.  Explained to the patient that the purpose of physical therapy is to teach the patient a home exercise program.  These exercises need to be performed every day in  order to decrease pain and prevent future occurrences of pain.  Likened it to brushing one's teeth.  Patient will start on physical therapy - [facility] program at [facility].  3.  MEDICATIONS: Patient will continue on Flexeril 10 mg as needed for muscle spasm.  He will continue on Aleve as needed for pain.  4.  INTERVENTIONS: No therapeutic steroid injections at this time.   5.  PATIENT EDUCATION: I thoroughly discussed the plan with the patient today.  He verbalizes understanding and agrees with the plan.      FOLLOW-UP:   Patient will follow up with me in 6 weeks.  All questions were answered.      VÍCTOR Lomas, MPA-C          SUBJECTIVE:  Yayo Larkin  Is a 61 y.o. male who presents today for new patient evaluation of low back pain.  Today patient reports left-sided mid to low back pain.  This symptom has been present for the last 2 weeks.  Patient states that he woke up 2 weeks ago and he felt severe pain at the left L3 paraspinal muscle region.  He does not recall trauma to the lower back however he has been playing golf constantly this summer.  Patient reports history of right L3-L4 laminectomy back in 2007.  Patient reports no radiation of pain proximally or distally.  Patient reports significant improvement in his symptoms after starting on Medrol Dosepak 2 weeks ago.  He also has been taken Flexeril 10 mg and that seems to help with his symptoms.  At this time he reports only 2-3 out of 10 intensity pain in the left mid back.  His symptoms are aggravated by turning to the sides, bending over and rates it at 8 out of 10 intensity on its worse prior to taking the prednisone pack.  His symptoms are alleviated by taking Aleve 2 tablets as needed for pain.  Patient had CT abdomen pelvis without contrast on July 30, 2017 due to GI bleed that showed tiny nonobstructing right renal calculus.  There was a linear hyperdensity in the cecum that was concerning for a small amount of active GI bleed.  Patient stated  that he was in the hospital for 3 days due to the GI bleed.  The CT scan did not show any masses concerning for malignancy.Patient denies urinary or bowel incontinence, unintentional weight loss, saddle anesthesia, fever or chills, balance difficulties.      Medications:    Current Outpatient Prescriptions   Medication Sig Dispense Refill     ascorbic acid, vitamin C, (VITAMIN C) 500 MG tablet Take 500 mg by mouth daily.       aspirin 81 mg chewable tablet Chew 81 mg daily.       cholecalciferol, vitamin D3, (VITAMIN D3) 1,000 unit capsule Take 1,000 Units by mouth daily.       DOCOSAHEXANOIC ACID/EPA (FISH OIL ORAL) Take 1,200 mg by mouth daily.       multivitamin with minerals (THERA-M) 9 mg iron-400 mcg Tab tablet Take 1 tablet by mouth daily.       cyclobenzaprine (FLEXERIL) 10 MG tablet Take 10 mg by mouth.       No current facility-administered medications for this encounter.        Allergies: No Known Allergies    PMH:    Past Medical History:   Diagnosis Date     GI (gastrointestinal bleed)        PSH:    Past Surgical History:   Procedure Laterality Date     COLONOSCOPY N/A 7/3/2017    Procedure: COLONOSCOPY with NS:EPINEPHRINE INJECTION AND PLACEMENT OF 3 CLIPS;  Surgeon: Naren Mancia MD;  Location: [facility];  Service:      LAMINECTOMY  2007       Family History:  No family history on file.    Social History:   Social History     Social History     Marital status:      Spouse name: N/A     Number of children: N/A     Years of education: N/A     Occupational History     Not on file.     Social History Main Topics     Smoking status: Never Smoker     Smokeless tobacco: Never Used     Alcohol use 1.2 oz/week     2 Shots of liquor per week     Drug use: No     Sexual activity: Not on file     Other Topics Concern     Not on file     Social History Narrative       ROS: Left-sided mid to low back pain.  Specifically negative for bowel/bladder dysfunction, fevers,chills, appetite changes,  unexplained weight loss.   Otherwise 13 systems reviewed are negative.  Please see the patient's intake questionnaire from today for details.      OBJECTIVE:  PHYSICAL EXAMINATION:    CONSTITUTIONAL:  Vital signs as above.  No acute distress.  The patient is well nourished and well groomed.  PSYCHIATRIC:  The patient is awake, alert, oriented to person, place, time and answering questions appropriately with clear speech.    SKIN:  Skin over the face, bilateral lower extremities, and posterior torso is clean, dry, intact without rashes.    GAIT:  Gait is non-antalgic.  The patient is able to heel and toe walk without significant difficulty.    STANDING EXAMINATION: Patient has mild tenderness at the left L3-L4 paraspinal muscle.  MUSCLE STRENGTH:  The patient has 5/5 strength for the bilateral hip flexors, knee flexors/extensors, ankle dorsiflexors/plantar flexors, great toe extensors, ankle evertors/invertors.  NEUROLOGICAL:  2/4 patellar, medial hamstring, and achilles reflexes bilaterally.  Negative Babinski's bilaterally.  No ankle clonus bilaterally. Sensation to light touch is intact in the bilateral L4, L5, and S1 dermatomes.  VASCULAR:  2/4  pulses bilaterally.  Bilateral lower extremities are warm.  There is no pitting edema of the bilateral lower extremities.  ABDOMINAL:  Soft, non-distended, non-tender throughout all quadrants.  No pulsatile mass palpated in the left lower quadrant.  LYMPH NODES:  No palpable or tender inguinal/popliteal lymph nodes.  MUSCULOSKELETAL: Negative straight leg raise bilaterally.  Negative Yyao test and hip impingement bilaterally.      RESULTS:      CT ABDOMEN PELVIS WO ORAL W IV CONTRAST  7/3/2017 6:28 AM      INDICATION: GI bleed, colonoscopy  TECHNIQUE: CT abdomen and pelvis. Multiplanar reformation images (MPR). Dose reduction techniques were used.   IV CONTRAST: omni 350  100 mL  COMPARISON: 8/31/2011     FINDINGS:  LUNG BASES: Clear.     ABDOMEN: Liver, spleen,  pancreas and adrenals are within normal limits. 2 mm right renal calculus. Subcentimeter renal hypodensities small for characterization. Atherosclerotic aorta. Normal caliber bowel.     PELVIS: Prominently fluid-filled colon. Linear hyperdensity in the cecum, coronal image 56-61. The prostate is prominent. Prostate calcification. Normal caliber appendix.     MUSCULOSKELETAL: Degenerative change osseous structures.     IMPRESSION:   CONCLUSION:  1.  Linear hyperdensity in the cecum. This is concerning for a small amount of active extravasation/site of GI bleeding.  2.  Tiny nonobstructing right renal calculus.     3.  Results were called to Dr. Cheng Lee at 0698

## 2021-06-13 NOTE — PROGRESS NOTES
Office Visit - Follow up    Yayo Larkin   62 y.o. male    Date of Visit: 10/16/2017    Chief Complaint   Patient presents with     Hypertension       Subjective: Hypertension.  1 month follow-up hypertension plus new cardiac murmur.  Seen by cardiologist.  Dr. Kent.  Echocardiogram from September 21 reviewed.  Aortic valve was bicuspid mild global thickening with mild stenosis and trace regurgitation.  Left ventricular ejection fraction 70% borderline hypertrophy is noted.    No blood in stool or urine medication list reviewed.    Denies chest pain shortness of breath or exertional syncope.  No palpitations.    ROS: A comprehensive review of systems was performed and was otherwise negative    Medications:  Prior to Admission medications    Medication Sig Start Date End Date Taking? Authorizing Provider   ascorbic acid, vitamin C, (VITAMIN C) 500 MG tablet Take 500 mg by mouth daily.   Yes PROVIDER, HISTORICAL   aspirin 81 mg chewable tablet Chew 81 mg daily.   Yes PROVIDER, HISTORICAL   cholecalciferol, vitamin D3, (VITAMIN D3) 1,000 unit capsule Take 1,000 Units by mouth daily.   Yes PROVIDER, HISTORICAL   DOCOSAHEXANOIC ACID/EPA (FISH OIL ORAL) Take 1,200 mg by mouth daily.   Yes PROVIDER, HISTORICAL   lisinopril (PRINIVIL,ZESTRIL) 10 MG tablet Take 1 tablet (10 mg total) by mouth daily. 9/21/17  Yes Maricel Austin MD   multivitamin with minerals (THERA-M) 9 mg iron-400 mcg Tab tablet Take 1 tablet by mouth daily.    PROVIDER, HISTORICAL   cyclobenzaprine (FLEXERIL) 10 MG tablet Take 10 mg by mouth. 8/13/17 10/16/17  PROVIDER, HISTORICAL       Allergies: No Known Allergies    Immunizations:   Immunization History   Administered Date(s) Administered     DT (pediatric) 07/11/2005     Influenza, inj, historic 10/29/2008     Influenza,seasonal quad, PF, 36+MOS 10/16/2017     Td, historic 07/11/2005     Tdap 02/25/2010       Exam Chest clear to auscultation and percussion.  Heart tones regular rhythm without  murmur rub or gallop.  Abdomen soft nontender no organomegaly.  No peritoneal signs.  Extremities free of edema cyanosis or clubbing.  Neck veins nondistended no thyromegaly or scleral icterus noted, carotids full.  Skin warm and dry easily conversant good spirited.  Normal intelligence.  Neurologically intact no gross localizing findings.  Soft systolic murmur at the base.  No diastolic component no S3.  Rhythm is regular.  Asymptomatic in terms of mild aortic stenosis.  Bicuspid aortic valve mother had the same.    Assessment and Plan  Hypertension controlled.  Although labile outside readings reviewed.  Continue lisinopril 10 mg daily.    Bicuspid aortic valve with mild aortic stenosis asymptomatic at this juncture discussed primary symptoms of note include chest pain shortness of breath or exertional syncope.  None of late denies palpitations.  Recheck 2 months.    Left ventricular hypertrophy.  With aortic regurgitation mild.  Discussed SBE prophylaxis as well.  Zostavax given October 9, 2017.  Overview of various types of aortic valvular disease reviewed in detail with the patient.    Time: total time spent with the patient was 25 minutes of which >50% was spent in counseling and coordination of care    The following high BMI interventions were performed this visit: encouragement to exercise    Vinh Rdz MD    Patient Active Problem List   Diagnosis     Lumbago     Mixed hyperlipidemia     Lower GI bleed     S/P colonoscopic polypectomy with possible polypectomy bleed     Acute blood loss anemia

## 2021-06-13 NOTE — PROGRESS NOTES
[facility] Heart Care Note    Assessment / Plan:    Mr. Larkin was recently found to have a new cardiac murmur.  On exam today it appears that he has mild to moderate aortic stenosis, and will benefit from an echocardiogram for further evaluation.    The pathology and natural history of aortic stenosis was reviewed, and was also outlined that he likely has a bicuspid aortic valve, and if that is confirmed on echocardiography, his first-degree relatives should be screened as well.    Regarding his hypertension, he was given a prescription for lisinopril 10 mg daily, and advised to keep a log of his pressures.  It is very likely that he will need at least 2 antihypertensives for effective blood pressure management, and these can be started in a staged manner.      Thank you for the opportunity to participate in the care of Yayo Larkin. Please do not hesitate to call with any questions or concerns regarding his cardiovascular status.    ______________________________________________________________________    Subjective:    It was a pleasure to see Yayo Larkin at the [facility] at the request of Dr Rdz for evaluation of a new heart murmur as well as hypertension.     Yayo Larkin is a pleasant 62 y.o. male with with no prior cardiac history, who was recently seen for his annual physical by Dr. Rdz.  He did not have any specific complaints at that time, but was noted on exam to have a cardiac murmur, and was also noted to be quite hypertensive, with systolic blood pressures in the 190s.  He has had subsequent follow-up blood pressure checks which have been in the 150-160 systolic range.    Mr. Larkin continues to state that he feels well today.  He plays golf at least 3 times a week and walks the course without any shortness of breath.  He also states that climbing a flight of stairs is not a problem.  He also denies any chest  discomfort.  ______________________________________________________________________    Problem List:  Patient Active Problem List   Diagnosis     Lumbago     Olecranon Bursitis     Mixed hyperlipidemia     General medical exam     Lower GI bleed     S/P colonoscopic polypectomy with possible polypectomy bleed     Acute blood loss anemia       Medical History:  Past Medical History:   Diagnosis Date     GI (gastrointestinal bleed)        Surgical History:  Past Surgical History:   Procedure Laterality Date     COLONOSCOPY N/A 7/3/2017    Procedure: COLONOSCOPY with NS:EPINEPHRINE INJECTION AND PLACEMENT OF 3 CLIPS;  Surgeon: Naren Mancia MD;  Location: [facility];  Service:      LAMINECTOMY         Social History:  Social History     Social History     Marital status:      Spouse name: N/A     Number of children: N/A     Years of education: N/A     Occupational History     Not on file.     Social History Main Topics     Smoking status: Never Smoker     Smokeless tobacco: Never Used     Alcohol use 1.2 oz/week     2 Shots of liquor per week     Drug use: No     Sexual activity: Not on file     Other Topics Concern     Not on file     Social History Narrative       Review of Systems: 12 organ system review done and negative except as noted in the HPI.    Family History:  Father  of an MI at 51, brother had coronary stents recently, and mother needed valve replacement in her 70s    Allergies:  No Known Allergies    Medications:  Current Outpatient Prescriptions   Medication Sig Dispense Refill     ascorbic acid, vitamin C, (VITAMIN C) 500 MG tablet Take 500 mg by mouth daily.       aspirin 81 mg chewable tablet Chew 81 mg daily.       cholecalciferol, vitamin D3, (VITAMIN D3) 1,000 unit capsule Take 1,000 Units by mouth daily.       cyclobenzaprine (FLEXERIL) 10 MG tablet Take 10 mg by mouth.       DOCOSAHEXANOIC ACID/EPA (FISH OIL ORAL) Take 1,200 mg by mouth daily.       multivitamin with  "minerals (THERA-M) 9 mg iron-400 mcg Tab tablet Take 1 tablet by mouth daily.       lisinopril (PRINIVIL,ZESTRIL) 10 MG tablet Take 1 tablet (10 mg total) by mouth daily. 30 tablet 11     No current facility-administered medications for this visit.        Objective:   Vital signs:  BP (!) 156/92 (Patient Site: Left Arm, Patient Position: Sitting, Cuff Size: Adult Regular)  Pulse 68  Resp 18  Ht 5' 8\" (1.727 m)  Wt 175 lb (79.4 kg)  BMI 26.61 kg/m2      Physical Exam:    GENERAL APPEARANCE: Alert, cooperative and in no acute distress.  HEENT: No scleral icterus. No Xanthelasma. Oral mucuos membranes pink and moist.  NECK: No JVD. Thyroid not visualized  CHEST: clear to auscultation  CARDIOVASCULAR: S1, S2 regular. 3/6 NATACHA   PULSES: Radial and posterior tibial pulses are intact and symmetric.   ABDOMEN: Nontender. BS+.   EXTREMITIES: No cyanosis, clubbing or edema.  SKIN: Warm, well perfused  NEURO: Grossly nonfocal    Lab Results:  LIPIDS:  Lab Results   Component Value Date    CHOL 188 09/14/2017    CHOL 197 03/08/2016    CHOL 203 (H) 03/03/2015     Lab Results   Component Value Date    HDL 62 09/14/2017    HDL 48 03/08/2016    HDL 53 03/03/2015     Lab Results   Component Value Date    LDLCALC 117 09/14/2017    LDLCALC 131 (H) 03/08/2016    LDLCALC 134 (H) 03/03/2015     Lab Results   Component Value Date    TRIG 45 09/14/2017    TRIG 92 03/08/2016    TRIG 82 03/03/2015     No components found for: CHOLHDL    BMP:  Lab Results   Component Value Date    CREATININE 0.84 09/14/2017    BUN 17 09/14/2017     09/14/2017    K 4.1 09/14/2017     09/14/2017    CO2 26 09/14/2017           LATONYA TURNER MD  [facility]              "

## 2021-06-13 NOTE — PROGRESS NOTES
Office Visit - Physical    Yayo Larkin   62 y.o. male    Date of Visit: 9/14/2017    Chief Complaint   Patient presents with     Annual Exam     Physical Exam   fasting       Subjective: Physical exam.    Fasting.    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    Back better after spine Center care and physical therapy likely musculoskeletal no clinical evidence for herniated disc.  No radicular component from the lumbar issues.    Non-smoker no excess alcohol.    No known drug allergies.    Last colonoscopy showed tubular adenomatous polyp on June 27, 2017 consistent with an advanced adenomatous polyp.  Subsequent to polypectomy the patient had GI bleed requiring hospital stay.  Resolved.    ROS: A comprehensive review of systems was performed and was otherwise negative    Medications:   Prior to Admission medications    Medication Sig Start Date End Date Taking? Authorizing Provider   ascorbic acid, vitamin C, (VITAMIN C) 500 MG tablet Take 500 mg by mouth daily.   Yes PROVIDER, HISTORICAL   aspirin 81 mg chewable tablet Chew 81 mg daily.   Yes PROVIDER, HISTORICAL   cholecalciferol, vitamin D3, (VITAMIN D3) 1,000 unit capsule Take 1,000 Units by mouth daily.   Yes PROVIDER, HISTORICAL   DOCOSAHEXANOIC ACID/EPA (FISH OIL ORAL) Take 1,200 mg by mouth daily.   Yes PROVIDER, HISTORICAL   multivitamin with minerals (THERA-M) 9 mg iron-400 mcg Tab tablet Take 1 tablet by mouth daily.   Yes PROVIDER, HISTORICAL   cyclobenzaprine (FLEXERIL) 10 MG tablet Take 10 mg by mouth. 8/13/17   PROVIDER, HISTORICAL       Allergies:No Known Allergies    Immunizations:   Immunization History   Administered Date(s) Administered     DT (pediatric) 07/11/2005     Influenza, inj, historic 10/29/2008     Td, historic 07/11/2005     Tdap 02/25/2010       Health Maintenance: Immunizations reviewed and up-to-date colonoscopy as noted above recently done this summer post polypectomy GI bleed requiring hospital  stay.    Past Medical History: History of malignant melanoma resected.    History of lumbar laminectomy.    Colon polyp with polypectomy and subsequent post-polypectomy GI bleed resolved.    Past Surgical History: Lumbar laminectomy.    Melanoma resected no clinical evidence of recurrence.    Family History: Mother living almost 85.    Father  in his 50s of a myocardial infarction.  Patient has been  twice his second wife  this past year of pneumococcal pneumonia with sepsis.  She was in her 50s.  First wife's health is also been in question.  3 biologic children are well one sibling  of rhabdomyosarcoma in her 20s.    Social History: Enjoys playing [employer] .  Businessman.    Exam Chest clear to auscultation and percussion.  Heart tones regular rhythm without murmur rub or gallop.  Abdomen soft nontender no organomegaly.  No peritoneal signs.  Extremities free of edema cyanosis or clubbing.  Neck veins nondistended no thyromegaly or scleral icterus noted, carotids full.  Skin warm and dry easily conversant good spirited.  Normal intelligence.  Neurologically intact no gross localizing findings.  Rest of examination in its entirety negative genital rectal exam negative mild prostate enlargement without nodularity good pulses noted in all 4 extremities no carotid bruits or thyromegaly noted no lymphadenopathy appreciated lymph bearing areas head eyes ears nose throat negative skin lymph negative lungs clear backs no severe spine tenderness heart tones revealed a soft systolic ejection murmur best heard at the left and right upper sternal borders.  Without definite radiation no diastolic component no S3.  Rhythm was regular.    Assessment and Plan  General medical examination at healthcare facility with associated elevated blood pressure readings ×2 right arm sitting plus soft systolic ejection murmur at the upper sternal borders left and right side.  Needs cardiac consultation plus  hemogram comprehensive metabolic profile urinalysis lipid panel PSA TSH.  RTC 1 month's time.    History of malignant melanoma resected no sign of recurrence.  Lumbar laminectomy.  With recent back muscle flare no evidence for lumbar radiculopathy.    Tubular adenomatous colon polyp status post polyp second polypectomy with postprocedure GI bleed requiring hospital stay resolved.  Ultimately colon polyp was described as advanced adenomatous colon polyp.    The following high BMI interventions were performed this visit: encouragement to exercise    Vinh Rdz MD    Patient Active Problem List   Diagnosis     Lumbago     Olecranon Bursitis     Mixed hyperlipidemia     General medical exam     Lower GI bleed     S/P colonoscopic polypectomy with possible polypectomy bleed     Acute blood loss anemia

## 2021-06-14 NOTE — PROGRESS NOTES
Office Visit - Follow up    Yayo Larkin   62 y.o. male    Date of Visit: 2017    Chief Complaint   Patient presents with     Hypertension       Subjective: Hypertension here in follow-up for hypertension.  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  He is 62 years of age.  He will be spending quite a bit of time this winter in [place].  This is his first winter without his wife who  of overwhelming streptococcal pneumonia infection.    Or pneumococcal pneumonia infection.    No blood in stool or urine no chest pain or shortness of breath.  Prior history of systolic ejection murmur asymptomatic in this regard that is no chest pain no shortness of breath no syncope or palpitations.    ROS: A comprehensive review of systems was performed and was otherwise negative    Medications:  Prior to Admission medications    Medication Sig Start Date End Date Taking? Authorizing Provider   aspirin 81 mg chewable tablet Chew 81 mg daily.   Yes PROVIDER, HISTORICAL   DOCOSAHEXANOIC ACID/EPA (FISH OIL ORAL) Take 1,200 mg by mouth daily.   Yes PROVIDER, HISTORICAL   lisinopril (PRINIVIL,ZESTRIL) 10 MG tablet Take 1 tablet (10 mg total) by mouth daily. 17  Yes Maricel Austin MD   multivitamin with minerals (THERA-M) 9 mg iron-400 mcg Tab tablet Take 1 tablet by mouth daily.   Yes PROVIDER, HISTORICAL   ascorbic acid, vitamin C, (VITAMIN C) 500 MG tablet Take 500 mg by mouth daily.  17  PROVIDER, HISTORICAL   cholecalciferol, vitamin D3, (VITAMIN D3) 1,000 unit capsule Take 1,000 Units by mouth daily.  17  PROVIDER, HISTORICAL       Allergies: No Known Allergies    Immunizations:   Immunization History   Administered Date(s) Administered     DT (pediatric) 2005     Influenza, inj, historic,unspecified 10/29/2008     Influenza,seasonal quad, PF, 36+MOS 10/16/2017     Td,adult,historic,unspecified 2005     Tdap 2010       Exam Chest clear to  auscultation and percussion.  Heart tones regular rhythm without murmur rub or gallop.  Abdomen soft nontender no organomegaly.  No peritoneal signs.  Extremities free of edema cyanosis or clubbing.  Neck veins nondistended no thyromegaly or scleral icterus noted, carotids full.  Skin warm and dry easily conversant good spirited.  Normal intelligence.  Neurologically intact no gross localizing findings.  Soft systolic murmur at the base.  No murmur heard in  diastole no gallops.  No leg edema neck veins are nondistended he appeared well.  No known drug allergies.    Assessment and Plan  Hypertension with improved control on lisinopril 10 mg daily.    Systolic ejection murmur uncertain etiology.    History of lower GI bleed after colonoscopy with polypectomy.    O2 sats 98% tubular adenomatous colon polyp sessile see colonoscopy report June 27, 2017 complicated by lower GI bleed after polypectomy.  Requiring hospitalization.  Resolved now    Time: total time spent with the patient was 25 minutes of which >50% was spent in counseling and coordination of care    The following high BMI interventions were performed this visit: encouragement to exercise    Vinh Rdz MD    Patient Active Problem List   Diagnosis     Lumbago     Mixed hyperlipidemia     Lower GI bleed     S/P colonoscopic polypectomy with possible polypectomy bleed     Acute blood loss anemia

## 2021-06-16 PROBLEM — K92.2 LOWER GI BLEED: Status: ACTIVE | Noted: 2017-07-03

## 2021-06-16 PROBLEM — I10 ESSENTIAL HYPERTENSION, BENIGN: Status: ACTIVE | Noted: 2018-07-10

## 2021-06-16 PROBLEM — Z98.890 S/P COLONOSCOPIC POLYPECTOMY: Status: ACTIVE | Noted: 2017-07-03

## 2021-06-16 NOTE — PROGRESS NOTES
Office Visit - Physical    Yayo Larkin   65 y.o. male    Date of Visit: 4/20/2021    Chief Complaint   Patient presents with     Annual Exam     Physical Exam   fasting     Shoulder Pain     left       Subjective: Physical examination.    65-year-old male  [facility] here for physical examination.  Pain left arm shoulder to elbow has trouble moving the arm behind his back consider rotator cuff tear injury tendinopathy suggest Dr. Favio Hunter at [facility].    Low back pain with epidural spinal injection in December 2020 while in [place].  [place] of [facility] [place] [facility].    Low back pain chronic intermittent.  Plays golf.    Covid Pfizer vaccine given.  X2 doses last 1 received April 7, 2021.    History of systolic ejection murmur advise reconsultation with Dr. Anton Perkins [facility] Department of cardiology.  No associated symptoms of chest pain shortness of breath exertional syncope or palpitations.  Occasional morning palpitations short-lived unassociated with any other symptoms.    Colonoscopy no sign of cancer June 29, 2020 Dr. Galarza at [facility].  Previous history of adenomatous colon polyps    ROS: A comprehensive review of systems was performed and was otherwise negative    Medications:   Prior to Admission medications    Medication Sig Start Date End Date Taking? Authorizing Provider   aspirin 81 mg chewable tablet Chew 81 mg daily.   Yes PROVIDER, HISTORICAL   DOCOSAHEXANOIC ACID/EPA (FISH OIL ORAL) Take 1,200 mg by mouth daily.   Yes PROVIDER, HISTORICAL   glucosamine-chondroitin 500-400 mg cap Take 1 capsule by mouth daily.   Yes PROVIDER, HISTORICAL   lisinopril (PRINIVIL,ZESTRIL) 10 MG tablet TAKE 2 TABLETS(20 MG) BY MOUTH DAILY 2/13/20  Yes Vinh Rdz MD   multivitamin with minerals (THERA-M) 9 mg iron-400 mcg Tab tablet Take 1 tablet by mouth daily.   Yes PROVIDER, HISTORICAL       Allergies:No Known Allergies    Immunizations:    Immunization History   Administered Date(s) Administered     COVID-19,PF,Pfizer 2021, 2021     DT (pediatric) 2005     INFLUENZA,SEASONAL QUAD, PF, =/> 6months 10/16/2017     Influenza, inj, historic,unspecified 10/29/2008, 2014     Td,adult,historic,unspecified 2005     Tdap 2010     ZOSTER, LIVE 10/09/2017     Zoster Vaccine Unspecified (Historical) 2020       Health Maintenance: Immunizations reviewed and up-to-date.  Colonoscopy up-to-date see above.  He has received Covid vaccine x2 last 1 received 2021 no known drug allergies medication list reviewed history of hypertension on lisinopril 10 mg daily today blood pressure excellent 130/74.    Non-smoker no excess alcohol no known drug allergies.  Prior history of consultation at the [facility] for conjunctivitis.  Resolved.    Past Medical History: History of malignant melanoma without sign of recurrence resected.    History of lumbar laminectomy for free fragment.    Past Surgical History: No anesthetic complications from any prior surgeries.    Family History: Mother living in her late 80s.    Mother  in his mid 50s of a myocardial infarction.    1 sister  in her 20s of rhabdomyosarcoma.    Wife  in her late 50s of pneumococcal sepsis 2017.    Semiretired.  Spends much of the wintertime in [place] the [facility].    Social History: Enjoys playing golf.  .    Exam Chest clear to auscultation and percussion.  Heart tones regular rhythm without murmur rub or gallop.  Abdomen soft nontender no organomegaly.  No peritoneal signs.  Extremities free of edema cyanosis or clubbing.  Neck veins nondistended no thyromegaly or scleral icterus noted, carotids full.  Skin warm and dry easily conversant good spirited.  Normal intelligence.  Neurologically intact no gross localizing findings.  Vital signs are stable afebrile.  Skin negative lymph negative neuro  negative psych normal HEENT negative male pattern baldness easily conversant hirsute.  Back straight no severe spine tenderness systolic ejection murmur with radiation to left carotid asymptomatic but for early morning palpitations that are short-lived.  There is no diastolic component no S3 the patient has no abdominal findings no abdominal bruits genital rectal exam negative small prostate good pulse tone in all 4 extremities no carotid bruits or thyromegaly.  There is a radiation of the left carotid bruit from the systolic ejection murmur at the base of the heart.    67 three-quarter inches tall 171 pounds up 4 pounds from previous BMI 26+    130/74 pulse 67 respirations 18 O2 sats 99% on room air temperature this morning 97.9 degrees he is easily conversant good spirited highly intelligent.    Assessment and Plan  General medical examination at health care facility check today hemogram comprehensive metabolic profile urinalysis PSA TSH lipid panel.    Left shoulder discomfort and upper arm discomfort with associated weakness and putting his arm behind the back consider rotator cuff tear syndrome or tendinopathy suggest Dr. Favio Hunter.    Palpitations with associated systolic ejection murmur and radiation to the left carotid suggest Dr. Anton Perkins to see at [facility] division of cardiology.  [phone].  He is up-to-date on colonoscopy.    The following high BMI interventions were performed this visit: encouragement to exercise    Vinh Rdz MD    Patient Active Problem List   Diagnosis     Lumbago     Mixed hyperlipidemia     Lower GI bleed     S/P colonoscopic polypectomy with possible polypectomy bleed     Acute blood loss anemia     Essential hypertension, benign

## 2021-06-19 NOTE — PROGRESS NOTES
Office Visit - Follow up    Yayo Larkin   62 y.o. male    Date of Visit: 7/16/2018    Chief Complaint   Patient presents with     Hypertension       Subjective: Hypertension.  Previous blood pressures were as high as 189/75.  Felt dizzy especially with change in head position.    Feels better now no chest pain or shortness of breath no blood in stool or urine medication list reviewed well-tolerated.    Seen in the office July 10, 2018 lisinopril increased to 20 mg daily then with Dr. Camarena's exam.  No blood in stool or urine no chest pain or shortness of breath.    Medication list reviewed well-tolerated normal effects.    Colonoscopy showed a tubular adenomatous colon polyp on June 27, 2017 follow-up suggested 3 years time with [facility] presiding Dr. Vicki Baker.    ROS: A comprehensive review of systems was performed and was otherwise negative    Medications:  Prior to Admission medications    Medication Sig Start Date End Date Taking? Authorizing Provider   aspirin 81 mg chewable tablet Chew 81 mg daily.   Yes PROVIDER, HISTORICAL   DOCOSAHEXANOIC ACID/EPA (FISH OIL ORAL) Take 1,200 mg by mouth daily.   Yes PROVIDER, HISTORICAL   lisinopril (PRINIVIL,ZESTRIL) 10 MG tablet Take 2 tablets (20 mg total) by mouth daily. 7/10/18  Yes Mikael Camarena MD   multivitamin with minerals (THERA-M) 9 mg iron-400 mcg Tab tablet Take 1 tablet by mouth daily.   Yes PROVIDER, HISTORICAL   naproxen sodium (ALEVE) 220 MG tablet Take 220 mg by mouth as needed for pain (Only use when playing golf).  7/16/18  PROVIDER, HISTORICAL       Allergies: No Known Allergies    Immunizations:   Immunization History   Administered Date(s) Administered     DT (pediatric) 07/11/2005     Influenza, inj, historic,unspecified 10/29/2008, 11/24/2014     Influenza,seasonal quad, PF, 36+MOS 10/16/2017     Td,adult,historic,unspecified 07/11/2005     Tdap 02/25/2010     ZOSTER, LIVE 10/09/2017       Exam Chest clear to  auscultation and percussion.  Heart tones regular rhythm without murmur rub or gallop.  Abdomen soft nontender no organomegaly.  No peritoneal signs.  Extremities free of edema cyanosis or clubbing.  Neck veins nondistended no thyromegaly or scleral icterus noted, carotids full.  Skin warm and dry easily conversant good spirited.  Normal intelligence.  Neurologically intact no gross localizing findings.    Assessment and Plan  Hypertension with improved control.  130/74.  Pulse 75 O2 sats 97%.    Colon polyp advanced adenoma see colonoscopy report 2017.    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    Time: total time spent with the patient was 25 minutes of which >50% was spent in counseling and coordination of care    The following high BMI interventions were performed this visit: encouragement to exercise return to clinic 1 month's time.    Vinh Rdz MD    Patient Active Problem List   Diagnosis     Lumbago     Mixed hyperlipidemia     Lower GI bleed     S/P colonoscopic polypectomy with possible polypectomy bleed     Acute blood loss anemia     Essential hypertension, benign

## 2021-06-19 NOTE — PROGRESS NOTES
Office Visit - Follow Up   Yayo Larkin   62 y.o. male    Date of Visit: 7/10/2018    Chief Complaint   Patient presents with     Hypertension     increased bp in the last 1-2 wks; this morning bp was 150's over 75        Assessment and Plan   1. Essential hypertension, benign  Blood pressure is high here today.  Increase lisinopril to 20 mg daily.  Labs as below.  Follow-up with Dr. Rdz in the near future.  - Basic Metabolic Panel  - Urinalysis-UC if Indicated    2. Vertigo  My biggest concern would be that he had a small CVA.  Check MRI for further evaluation.  Follow-up soon with his primary physician.  - MR Brain Without Contrast; Future    3. High risk medication use  Urged discontinuation of naproxen at this time.  - Basic Metabolic Panel  - Urinalysis-UC if Indicated    4. Insomnia  Follow-up with Dr. Rdz.    5. Grief  He seems to be grieving normally.    6. HTN (hypertension)  As above  - lisinopril (PRINIVIL,ZESTRIL) 10 MG tablet; Take 2 tablets (20 mg total) by mouth daily.  Dispense: 30 tablet; Refill: 11        Return in about 1 week (around 7/17/2018) for Recheck.     History of Present Illness   This 62 y.o. old patient of Dr. Syed which is comes in today as an urgent add-on for evaluation of high blood pressure.  He states that last week he was doing some stretching exercises and developed some acute onset of feeling off for unsteady.  He was feeling like the room was shifting or spinning.  Then over the weekend, he was golfing, and he had a difficult time teeing up the ball.  He is playing very poorly.  He just felt off and could not focus properly.  He is somewhat of a vague historian about all of this.  There is no falls.  No focal neuro deficits from what I can tell.  No weakness per se and no speech disturbance.  Blood pressure was quite high however.  He checked his blood pressure was running in the 180s.  He did not sleep very well over the weekend.  He did not drink any  "alcohol and does not usually drink to excess.  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  He is also been grieving the loss of his wife about a year and 3 months ago.  She  of pneumococcal pneumonia.    He was recently diagnosed with a bicuspid aortic valve and aortic stenosis.  He is a family history of hypertension a sister.  He takes occasional naproxen but only took about 3 pills last week.    Review of Systems: A comprehensive review of systems was negative except as noted.     Medications, Allergies and Problem List   Reviewed and updated     Physical Exam   General Appearance:   Pleasant gentleman.  Blood pressure is high.  No focal neurologic deficits.    /80  Pulse 70  Ht 5' 8\" (1.727 m)  Wt 176 lb (79.8 kg)  SpO2 98%  BMI 26.76 kg/m2         Additional Information   Current Outpatient Prescriptions   Medication Sig Dispense Refill     aspirin 81 mg chewable tablet Chew 81 mg daily.       DOCOSAHEXANOIC ACID/EPA (FISH OIL ORAL) Take 1,200 mg by mouth daily.       lisinopril (PRINIVIL,ZESTRIL) 10 MG tablet Take 2 tablets (20 mg total) by mouth daily. 30 tablet 11     multivitamin with minerals (THERA-M) 9 mg iron-400 mcg Tab tablet Take 1 tablet by mouth daily.       naproxen sodium (ALEVE) 220 MG tablet Take 220 mg by mouth as needed for pain (Only use when playing golf).       No current facility-administered medications for this visit.      No Known Allergies  Social History   Substance Use Topics     Smoking status: Never Smoker     Smokeless tobacco: Never Used     Alcohol use 1.2 oz/week     2 Shots of liquor per week       Review and/or order of clinical lab tests:  Review and/or order of radiology tests:  Review and/or order of medicine tests:  Discussion of test results with performing physician:  Decision to obtain old records and/or obtain history from someone other than the patient:  Review and summarization of old records and/or obtaining history " from someone other than the patient and.or discussion of case with another health care provider:  Independent visualization of image, tracing or specimen itself:    Time: total time spent with the patient was 25 minutes of which >50% was spent in counseling and coordination of care     Mikael Camarena MD

## 2021-06-19 NOTE — PROGRESS NOTES
Office Visit - Follow up    Yayo Larkin   62 y.o. male    Date of Visit: 8/20/2018    Chief Complaint   Patient presents with     Hypertension       Subjective: Hypertension.    Outside blood pressure readings reviewed.    Patient forgot to take his lisinopril as his antihypertensive med for 2 days.    No blood in stool or urine no chest pain shortness of breath no headache or dizziness.    Medication list reviewed well-tolerated.  No known drug allergies.    Tubular adenomatous colon polyp seen on colonoscopy dated June 27, 2018 [facility] Dr. Vicki Baker presiding.    ROS: A comprehensive review of systems was performed and was otherwise negative    Medications:  Prior to Admission medications    Medication Sig Start Date End Date Taking? Authorizing Provider   aspirin 81 mg chewable tablet Chew 81 mg daily.   Yes PROVIDER, HISTORICAL   DOCOSAHEXANOIC ACID/EPA (FISH OIL ORAL) Take 1,200 mg by mouth daily.   Yes PROVIDER, HISTORICAL   glucosamine-chondroitin 500-400 mg cap Take 1 capsule by mouth daily.   Yes PROVIDER, HISTORICAL   lisinopril (PRINIVIL,ZESTRIL) 10 MG tablet TAKE 2 TABLETS(20 MG) BY MOUTH DAILY 8/17/18  Yes Vinh Rdz MD   multivitamin with minerals (THERA-M) 9 mg iron-400 mcg Tab tablet Take 1 tablet by mouth daily.   Yes PROVIDER, HISTORICAL       Allergies: No Known Allergies    Immunizations:   Immunization History   Administered Date(s) Administered     DT (pediatric) 07/11/2005     Influenza, inj, historic,unspecified 10/29/2008, 11/24/2014     Influenza,seasonal quad, PF, 36+MOS 10/16/2017     Td,adult,historic,unspecified 07/11/2005     Tdap 02/25/2010     ZOSTER, LIVE 10/09/2017       Exam Chest clear to auscultation and percussion.  Heart tones regular rhythm without murmur rub or gallop.  Abdomen soft nontender no organomegaly.  No peritoneal signs.  Extremities free of edema cyanosis or clubbing.  Neck veins nondistended no thyromegaly or scleral icterus noted,  carotids full.  Skin warm and dry easily conversant good spirited.  Normal intelligence.  Neurologically intact no gross localizing findings.    Assessment and Plan  Hypertension with mild systolic elevation.  Best reading today 142/78.  Pulse 75 respirations 18 O2 sats 90% on room air.    Tubular adenomatous colon polyp see colonoscopy report June 27, 2018.    History of hyperlipidemia mixed no history of target organ damage secondary to same.  History of GI bleeding after colonoscopy.  Status post polypectomy.  Asymptomatic at this juncture.    Time: total time spent with the patient was 15 minutes of which >50% was spent in counseling and coordination of care    The following high BMI interventions were performed this visit: encouragement to exercise    Vinh Rdz MD    Patient Active Problem List   Diagnosis     Lumbago     Mixed hyperlipidemia     Lower GI bleed     S/P colonoscopic polypectomy with possible polypectomy bleed     Acute blood loss anemia     Essential hypertension, benign

## 2021-06-20 NOTE — PROGRESS NOTES
Office Visit - Physical    Yayo Larkin   63 y.o. male    Date of Visit: 9/17/2018    Chief Complaint   Patient presents with     Annual Exam     Physical Exam    fasting       Subjective: Physical exam.    63-year-old male here for physical exam.  Fasting.  Left eye irritation Visine being used.  Discuss dry eye.  Suggest Dr. Anton Seay at the [place] [facility].    Colonoscopy showed a advanced adenomatous colon polyp date of colonoscopy June 27, 2017 with Dr. Baker.  Suggest repeat study colonoscopy in 2020.    Non-smoker no excess alcohol.    No known drug allergies.    ROS: A comprehensive review of systems was performed and was otherwise negative    Medications:   Prior to Admission medications    Medication Sig Start Date End Date Taking? Authorizing Provider   aspirin 81 mg chewable tablet Chew 81 mg daily.   Yes PROVIDER, HISTORICAL   DOCOSAHEXANOIC ACID/EPA (FISH OIL ORAL) Take 1,200 mg by mouth daily.   Yes PROVIDER, HISTORICAL   glucosamine-chondroitin 500-400 mg cap Take 1 capsule by mouth daily.   Yes PROVIDER, HISTORICAL   lisinopril (PRINIVIL,ZESTRIL) 10 MG tablet TAKE 2 TABLETS(20 MG) BY MOUTH DAILY 8/17/18  Yes Vinh Rdz MD   multivitamin with minerals (THERA-M) 9 mg iron-400 mcg Tab tablet Take 1 tablet by mouth daily.   Yes PROVIDER, HISTORICAL       Allergies:No Known Allergies    Immunizations:   Immunization History   Administered Date(s) Administered     DT (pediatric) 07/11/2005     Influenza, inj, historic,unspecified 10/29/2008, 11/24/2014     Influenza,seasonal quad, PF, 36+MOS 10/16/2017     Td,adult,historic,unspecified 07/11/2005     Tdap 02/25/2010     ZOSTER, LIVE 10/09/2017       Health Maintenance: Immunizations reviewed and up-to-date has received the shingles vaccine suggested flu shot this fall.    Past Medical History: History of malignant melanoma resected.    History of lumbar laminectomy.    Advanced adenomatous colon polyp seen in colonoscopy in  .    Past Surgical History: Lumbar laminectomy for free fragment.    Resected melanoma no sign of recurrence anticipate seeing dermatologist next week in follow-up.    Family History: Mother living in her mid 80s.    Father  in his mid 50s myocardial infarction.  1 sister  in her 20s of rhabdomyosarcoma.  Wife  18 months ago of pneumococcal sepsis.  2017 [place].    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    Exam Chest clear to auscultation and percussion.  Heart tones regular rhythm without murmur rub or gallop.  Abdomen soft nontender no organomegaly.  No peritoneal signs.  Extremities free of edema cyanosis or clubbing.  Neck veins nondistended no thyromegaly or scleral icterus noted, carotids full.  Skin warm and dry easily conversant good spirited.  Normal intelligence.  Neurologically intact no gross localizing findings.  Rest of exam negative in its entirety including negative skin lymph neuropsych HEENT back straight no severe spine tenderness general rectal exam negative good pulses noted in all 4 extremities.  Head eyes ears nose throat negative.    Assessment and Plan  General medical examination at healthcare facility check hemogram plus comprehensive metabolic profile urinalysis lipid panel PSA and TSH.    History of hyperlipidemia and hypertension and malignant melanoma resected.  History of tubular adenomatous advance colon polyp suggest repeat study no later than  with [facility].  See colonoscopy report 2017 no sign of cancer however.  Dr. Vicki Baker presiding.    The following high BMI interventions were performed this visit: encouragement to exercise    Vinh Rdz MD    Patient Active Problem List   Diagnosis     Lumbago     Mixed hyperlipidemia     Lower GI bleed     S/P colonoscopic polypectomy with possible polypectomy bleed     Acute blood loss anemia     Essential hypertension, benign

## 2021-06-20 NOTE — LETTER
Letter by Vinh Rdz MD at      Author: Vinh Rdz MD Service: -- Author Type: --    Filed:  Encounter Date: 12/31/2019 Status: Signed         Yayo Larkin  [address] [facility] [id number]             December 31, 2019         Dear Mr. Larkin,    Below are the results from your recent visit:    Resulted Orders   HM2(CBC w/o Differential)   Result Value Ref Range    WBC 5.6 4.0 - 11.0 thou/uL    RBC 4.48 4.40 - 6.20 mill/uL    Hemoglobin 15.1 14.0 - 18.0 g/dL    Hematocrit 43.4 40.0 - 54.0 %    MCV 97 80 - 100 fL    MCH 33.7 27.0 - 34.0 pg    MCHC 34.7 32.0 - 36.0 g/dL    RDW 11.2 11.0 - 14.5 %    Platelets 157 140 - 440 thou/uL    MPV 7.8 7.0 - 10.0 fL   Comprehensive Metabolic Panel   Result Value Ref Range    Sodium 139 136 - 145 mmol/L    Potassium 4.4 3.5 - 5.0 mmol/L    Chloride 104 98 - 107 mmol/L    CO2 26 22 - 31 mmol/L    Anion Gap, Calculation 9 5 - 18 mmol/L    Glucose 90 70 - 125 mg/dL    BUN 18 8 - 22 mg/dL    Creatinine 0.74 0.70 - 1.30 mg/dL    GFR MDRD Af Amer >60 >60 mL/min/1.73m2    GFR MDRD Non Af Amer >60 >60 mL/min/1.73m2    Bilirubin, Total 0.6 0.0 - 1.0 mg/dL    Calcium 9.4 8.5 - 10.5 mg/dL    Protein, Total 6.9 6.0 - 8.0 g/dL    Albumin 3.9 3.5 - 5.0 g/dL    Alkaline Phosphatase 97 45 - 120 U/L    AST 28 0 - 40 U/L    ALT 36 0 - 45 U/L    Narrative    Fasting Glucose reference range is 70-99 mg/dL per  American Diabetes Association (ADA) guidelines.   Lipid Cascade   Result Value Ref Range    Cholesterol 158 <=199 mg/dL    Triglycerides 92 <=149 mg/dL    HDL Cholesterol 41 >=40 mg/dL    LDL Calculated 99 <=129 mg/dL    Patient Fasting > 8hrs? Unknown    Thyroid Stimulating Hormone (TSH)   Result Value Ref Range    TSH 1.43 0.30 - 5.00 uIU/mL   Urinalysis-UC if Indicated   Result Value Ref Range    Color, UA Yellow Colorless, Yellow, Straw, Light Yellow    Clarity, UA Clear Clear    Glucose, UA Negative Negative    Bilirubin, UA Negative Negative     Ketones, UA Negative Negative    Specific Gravity, UA 1.017 1.001 - 1.030    Blood, UA Negative Negative    pH, UA 6.0 4.5 - 8.0    Protein, UA Negative Negative mg/dL    Urobilinogen, UA <2.0 E.U./dL <2.0 E.U./dL, 2.0 E.U./dL    Nitrite, UA Negative Negative    Leukocytes, UA Negative Negative    Narrative    Microscopic not indicated  UC not indicated   PSA (Prostatic-Specific Antigen), Annual Screen   Result Value Ref Range    PSA 3.4 0.0 - 4.5 ng/mL    Narrative    Method is Abbott Prostate-Specific Antigen (PSA)  Standard-WHO 1st International (90:10)       All very good results    Please call with questions or contact us using [employer].    Sincerely,        Electronically signed by Vinh Rdz MD

## 2021-06-21 NOTE — LETTER
Letter by Vinh Rdz MD at      Author: Vinh Rdz MD Service: -- Author Type: --    Filed:  Encounter Date: 4/22/2021 Status: (Other)         Yayo Larkin  [address] [facility] [address]             April 22, 2021         Dear Mr. Larkin,    Below are the results from your recent visit:    Resulted Orders   HM2(CBC w/o Differential)   Result Value Ref Range    WBC 4.7 4.0 - 11.0 thou/uL    RBC 4.59 4.40 - 6.20 mill/uL    Hemoglobin 15.0 14.0 - 18.0 g/dL    Hematocrit 42.9 40.0 - 54.0 %    MCV 94 80 - 100 fL    MCH 32.7 27.0 - 34.0 pg    MCHC 35.0 32.0 - 36.0 g/dL    RDW 11.7 11.0 - 14.5 %    Platelets 173 140 - 440 thou/uL    MPV 9.6 7.0 - 10.0 fL   Comprehensive Metabolic Panel   Result Value Ref Range    Sodium 139 136 - 145 mmol/L    Potassium 4.5 3.5 - 5.0 mmol/L    Chloride 102 98 - 107 mmol/L    CO2 27 22 - 31 mmol/L    Anion Gap, Calculation 10 5 - 18 mmol/L    Glucose 96 70 - 125 mg/dL    BUN 20 8 - 22 mg/dL    Creatinine 0.78 0.70 - 1.30 mg/dL    GFR MDRD Af Amer >60 >60 mL/min/1.73m2    GFR MDRD Non Af Amer >60 >60 mL/min/1.73m2    Bilirubin, Total 0.7 0.0 - 1.0 mg/dL    Calcium 9.7 8.5 - 10.5 mg/dL    Protein, Total 7.2 6.0 - 8.0 g/dL    Albumin 4.2 3.5 - 5.0 g/dL    Alkaline Phosphatase 84 45 - 120 U/L    AST 19 0 - 40 U/L    ALT 17 0 - 45 U/L    Narrative    Fasting Glucose reference range is 70-99 mg/dL per  American Diabetes Association (ADA) guidelines.   Lipid Cascade   Result Value Ref Range    Cholesterol 210 (H) <=199 mg/dL    Triglycerides 74 <=149 mg/dL    HDL Cholesterol 59 >=40 mg/dL    LDL Calculated 136 (H) <=129 mg/dL    Patient Fasting > 8hrs? Yes    Thyroid Stimulating Hormone (TSH)   Result Value Ref Range    TSH 1.10 0.30 - 5.00 uIU/mL   Urinalysis-UC if Indicated   Result Value Ref Range    Color, UA Yellow Colorless, Yellow, Straw, Light Yellow    Clarity, UA Clear Clear    Glucose, UA Negative Negative    Protein, UA Negative Negative     Bilirubin, UA Negative Negative    Urobilinogen, UA 0.2 E.U./dL 0.2 E.U./dL, 1.0 E.U./dL    pH, UA 7.0 5.0 - 8.0    Blood, UA Negative Negative    Ketones, UA Negative Negative    Nitrite, UA Negative Negative    Leukocytes, UA Negative Negative    Specific Gravity, UA 1.020 1.005 - 1.030    Narrative    Microscopic not indicated  UC not indicated   PSA (Prostatic-Specific Antigen), Annual Screen   Result Value Ref Range    PSA 3.3 0.0 - 4.5 ng/mL    Narrative    Method is Abbott Prostate-Specific Antigen (PSA)  Standard-WHO 1st International (90:10)       All very good results but lipids are a bit too high and would recommend rosuvastatin 5 mg tablets number 90  3 rf    Please call with questions or contact us using [employer].    Sincerely,        Electronically signed by Vinh Rdz MD

## 2021-06-24 NOTE — TELEPHONE ENCOUNTER
Who is calling:  Patient  Reason for Call:  Requesting a return phone call from provider. Patient declined on providing any additional information.  Date of last appointment with primary care: 09/17/2018  Has the patient been recently seen:  No  Okay to leave a detailed message: No

## 2021-07-10 ENCOUNTER — HEALTH MAINTENANCE LETTER (OUTPATIENT)
Age: 66
End: 2021-07-10

## 2021-08-24 ENCOUNTER — OFFICE VISIT (OUTPATIENT)
Dept: CARDIOLOGY | Facility: CLINIC | Age: 66
End: 2021-08-24
Payer: COMMERCIAL

## 2021-08-24 VITALS
DIASTOLIC BLOOD PRESSURE: 78 MMHG | HEIGHT: 69 IN | BODY MASS INDEX: 25.33 KG/M2 | HEART RATE: 66 BPM | RESPIRATION RATE: 16 BRPM | SYSTOLIC BLOOD PRESSURE: 136 MMHG | WEIGHT: 171 LBS

## 2021-08-24 DIAGNOSIS — Z82.49 FAMILY HISTORY OF ISCHEMIC HEART DISEASE: ICD-10-CM

## 2021-08-24 DIAGNOSIS — Q23.81 BICUSPID AORTIC VALVE: ICD-10-CM

## 2021-08-24 DIAGNOSIS — I10 ESSENTIAL HYPERTENSION, BENIGN: Primary | ICD-10-CM

## 2021-08-24 DIAGNOSIS — E78.2 MIXED HYPERLIPIDEMIA: ICD-10-CM

## 2021-08-24 PROCEDURE — 99204 OFFICE O/P NEW MOD 45 MIN: CPT | Performed by: INTERNAL MEDICINE

## 2021-08-24 RX ORDER — LISINOPRIL 10 MG/1
2 TABLET ORAL DAILY
COMMUNITY
Start: 2021-05-11 | End: 2023-06-13

## 2021-08-24 RX ORDER — BEPOTASTINE BESILATE 15 MG/ML
1 SOLUTION/ DROPS OPHTHALMIC DAILY PRN
COMMUNITY
Start: 2021-04-21

## 2021-08-24 RX ORDER — AZELASTINE HYDROCHLORIDE 0.5 MG/ML
1 SOLUTION/ DROPS OPHTHALMIC DAILY PRN
COMMUNITY
Start: 2021-07-13

## 2021-08-24 RX ORDER — ASPIRIN 81 MG/1
81 TABLET, CHEWABLE ORAL DAILY
COMMUNITY

## 2021-08-24 RX ORDER — SODIUM PHOSPHATE,MONO-DIBASIC 19G-7G/118
1 ENEMA (ML) RECTAL DAILY
COMMUNITY
End: 2023-06-13

## 2021-08-24 RX ORDER — MULTIPLE VITAMINS W/ MINERALS TAB 9MG-400MCG
1 TAB ORAL DAILY
COMMUNITY

## 2021-08-24 RX ORDER — ROSUVASTATIN CALCIUM 5 MG/1
1 TABLET, COATED ORAL DAILY
COMMUNITY
Start: 2021-04-22 | End: 2022-02-14

## 2021-08-24 ASSESSMENT — MIFFLIN-ST. JEOR: SCORE: 1556.03

## 2021-08-24 NOTE — LETTER
8/24/2021    Vinh Rdz MD  [address]  [place] [address]    RE: Yayo Larkin       Dear Colleague,    I had the pleasure of seeing Yayo Larkin in the [facility].            Assessment/Recommendations   Patient with known bicuspid aortic valve with mild stenosis and trace insufficiency in 2017. His functional capacity is stable and his left ventricular ejection fraction was normal in 2017 as well. Aortic root was of normal size at that time.    Would recommend repeat echocardiogram to evaluate aortic valve, left ventricular size and left ventricular systolic function. We may want to consider thoracic MRA to evaluate the ascending aorta given the bicuspid aortic valve. This would give us a baseline measurement.    Given his significant family history of premature coronary artery disease, if his echocardiogram does not show severe area of stenosis, I would have a low threshold to do a stress echocardiogram as I want him to be exercising in a vigorous mode and want to know that this is safe.    I agree with an aggressive approach to his LDL cholesterol and his blood pressure is currently at goal.    Thank you for allowing us to participate in his care.       History of Present Illness/Subjective    Mr. Yayo Larkin is a 65 year old male with known history of bicuspid aortic valve. Echocardiogram in 2017 documented this as well and has normal left ventricular size and systolic function. He had mild aortic stenosis and trace insufficiency at that time. He has felt well since that time. He walks or golfs on a regular basis without difficulty. He walks while playing golf 3-4 times a week. When he is in [place] he will walk for 2 to 5 miles without any difficulty. He denies unusual shortness of breath with activity, paroxysmal nocturnal dyspnea, orthopnea, peripheral edema, syncope or near syncopal episodes. He gets  "occasional fluttering in his chest when he awakes in the morning but it is short and is not associated with syncopal or near syncopal episodes. He does not complain of this during his waking hours when he is upright. He has had some mild increase in his lipids and is now on Crestor plus fish oil. His family history is quite significant. His brother had a stent and quadruple bypass. Father had heart problems starting at age 41 and  of an MI at age 50. Mother had a bicuspid aortic valve and had valve replacement at age 74. The patient has been treated for hypertension but is not diabetic and is never smoked cigarettes.    He grew up in the [facility] [facility]. 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 He is a .        ECG: Personally reviewed. EKG from 2017 shows sinus bradycardia and is otherwise unremarkable.       Physical Examination Review of Systems   /78 (BP Location: Right arm, Patient Position: Sitting, Cuff Size: Adult Regular)   Pulse 66   Resp 16   Ht 1.753 m (5' 9\")   Wt 77.6 kg (171 lb)   BMI 25.25 kg/m    Body mass index is 25.25 kg/m .  Wt Readings from Last 3 Encounters:   21 77.6 kg (171 lb)   21 77.6 kg (171 lb)   19 75.8 kg (167 lb)     General Appearance:   Alert, cooperative and in no acute distress.   ENT/Mouth: Patient wearing a mask.      EYES:  no scleral icterus, normal conjunctivae   Neck: JVP normal. No Hepatojugular reflux. Thyroid not visualized.   Chest/Lungs:   Lungs are clear to auscultation, equal chest wall expansion.   Cardiovascular:   S1, S2 with 2/6 systolic murmur ,clicks or rubs. Brachial, radial and posterior tibial pulses are intact and symetric. No carotid bruits noted. I did not hear diastolic murmur.   Abdomen:  Nontender. BS+.    Extremities: No cyanosis, clubbing or edema   Skin: no xanthelasma, warm.    Neurologic: normal arm movement bilateral, no " "tremors     Psychiatric: Appropriate affect.      Enc Vitals  BP: 136/78  Pulse: 66  Resp: 16  Weight: 77.6 kg (171 lb) (With shoes.)  Height: 175.3 cm (5' 9\")                                           Medical History  Surgical History Family History Social History   Past Medical History:   Diagnosis Date     GI (gastrointestinal bleed)      Olecranon bursitis     Past Surgical History:   Procedure Laterality Date     COLONOSCOPY N/A 7/3/2017    Procedure: COLONOSCOPY with NS:EPINEPHRINE INJECTION AND PLACEMENT OF 3 CLIPS;  Surgeon: Naren Mancia MD;  Location: [facility];  Service:      LAMINECTOMY  2007    No family history on file. Social History     Socioeconomic History     Marital status:      Spouse name: Not on file     Number of children: Not on file     Years of education: Not on file     Highest education level: Not on file   Occupational History     Not on file   Tobacco Use     Smoking status: Never Smoker     Smokeless tobacco: Never Used   Substance and Sexual Activity     Alcohol use: Yes     Alcohol/week: 4.0 standard drinks     Drug use: No     Sexual activity: Not on file   Other Topics Concern     Not on file   Social History Narrative     Not on file     Social Determinants of Health     Financial Resource Strain:      Difficulty of Paying Living Expenses:    Food Insecurity:      Worried About Running Out of Food in the Last Year:      Ran Out of Food in the Last Year:    Transportation Needs:      Lack of Transportation (Medical):      Lack of Transportation (Non-Medical):    Physical Activity:      Days of Exercise per Week:      Minutes of Exercise per Session:    Stress:      Feeling of Stress :    Social Connections:      Frequency of Communication with Friends and Family:      Frequency of Social Gatherings with Friends and Family:      Attends Nondenominational Services:      Active Member of Clubs or Organizations:      Attends Club or Organization Meetings:      Marital " Status:    Intimate Partner Violence:      Fear of Current or Ex-Partner:      Emotionally Abused:      Physically Abused:      Sexually Abused:           Medications  Allergies   Current Outpatient Medications   Medication Sig Dispense Refill     aspirin (ASA) 81 MG chewable tablet Take 81 mg by mouth daily       azelastine (OPTIVAR) 0.05 % ophthalmic solution Place 1 drop into both eyes daily as needed       BEPREVE 1.5 % SOLN Place 1 drop into both eyes daily as needed       glucosamine-chondroitin 500-400 MG CAPS per capsule Take 1 capsule by mouth daily       lisinopril (ZESTRIL) 10 MG tablet Take 2 tablets by mouth daily       multivitamin w/minerals (MULTIVITAMIN, THERAPEUTIC WITH MINERALS) tablet Take 1 tablet by mouth daily       NO ACTIVE MEDICATIONS        rosuvastatin (CRESTOR) 5 MG tablet Take 1 tablet by mouth daily       acetaminophen-codeine 300-30 MG per tablet Take 0.5-2 tablets by mouth 2 times daily as needed for pain. 20 tablet 0    No Known Allergies      Lab Results    Chemistry/lipid CBC Cardiac Enzymes/BNP/TSH/INR   Lab Results   Component Value Date    CHOL 210 (H) 04/20/2021    HDL 59 04/20/2021    TRIG 74 04/20/2021    BUN 20 04/20/2021     04/20/2021    CO2 27 04/20/2021    Lab Results   Component Value Date    WBC 4.7 04/20/2021    HGB 15.0 04/20/2021    HCT 42.9 04/20/2021    MCV 94 04/20/2021     04/20/2021    Lab Results   Component Value Date    TSH 1.10 04/20/2021                                               Thank you for allowing me to participate in the care of your patient.      Sincerely,     Jarrell Perkins MD     [facility]  cc:   Vinh Rdz MD  [address] [place] [address]

## 2021-08-24 NOTE — PROGRESS NOTES
Assessment/Recommendations   Patient with known bicuspid aortic valve with mild stenosis and trace insufficiency in 2017. His functional capacity is stable and his left ventricular ejection fraction was normal in 2017 as well. Aortic root was of normal size at that time.    Would recommend repeat echocardiogram to evaluate aortic valve, left ventricular size and left ventricular systolic function. We may want to consider thoracic MRA to evaluate the ascending aorta given the bicuspid aortic valve. This would give us a baseline measurement.    Given his significant family history of premature coronary artery disease, if his echocardiogram does not show severe area of stenosis, I would have a low threshold to do a stress echocardiogram as I want him to be exercising in a vigorous mode and want to know that this is safe.    I agree with an aggressive approach to his LDL cholesterol and his blood pressure is currently at goal.    Thank you for allowing us to participate in his care.       History of Present Illness/Subjective    Mr. Yayo Larkin is a 65 year old male with known history of bicuspid aortic valve. Echocardiogram in 2017 documented this as well and has normal left ventricular size and systolic function. He had mild aortic stenosis and trace insufficiency at that time. He has felt well since that time. He walks or golfs on a regular basis without difficulty. He walks while playing golf 3-4 times a week. When he is in [place] he will walk for 2 to 5 miles without any difficulty. He denies unusual shortness of breath with activity, paroxysmal nocturnal dyspnea, orthopnea, peripheral edema, syncope or near syncopal episodes. He gets occasional fluttering in his chest when he awakes in the morning but it is short and is not associated with syncopal or near syncopal episodes. He does not complain of this during his waking hours when he is upright. He has had some mild increase in his lipids and is  "now on Crestor plus fish oil. His family history is quite significant. His brother had a stent and quadruple bypass. Father had heart problems starting at age 41 and  of an MI at age 50. Mother had a bicuspid aortic valve and had valve replacement at age 74. The patient has been treated for hypertension but is not diabetic and is never smoked cigarettes.    He grew up in the [facility]. 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 He is a .        ECG: Personally reviewed. EKG from 2017 shows sinus bradycardia and is otherwise unremarkable.       Physical Examination Review of Systems   /78 (BP Location: Right arm, Patient Position: Sitting, Cuff Size: Adult Regular)   Pulse 66   Resp 16   Ht 1.753 m (5' 9\")   Wt 77.6 kg (171 lb)   BMI 25.25 kg/m    Body mass index is 25.25 kg/m .  Wt Readings from Last 3 Encounters:   21 77.6 kg (171 lb)   21 77.6 kg (171 lb)   19 75.8 kg (167 lb)     General Appearance:   Alert, cooperative and in no acute distress.   ENT/Mouth: Patient wearing a mask.      EYES:  no scleral icterus, normal conjunctivae   Neck: JVP normal. No Hepatojugular reflux. Thyroid not visualized.   Chest/Lungs:   Lungs are clear to auscultation, equal chest wall expansion.   Cardiovascular:   S1, S2 with 2/6 systolic murmur ,clicks or rubs. Brachial, radial and posterior tibial pulses are intact and symetric. No carotid bruits noted. I did not hear diastolic murmur.   Abdomen:  Nontender. BS+.    Extremities: No cyanosis, clubbing or edema   Skin: no xanthelasma, warm.    Neurologic: normal arm movement bilateral, no tremors     Psychiatric: Appropriate affect.      Enc Vitals  BP: 136/78  Pulse: 66  Resp: 16  Weight: 77.6 kg (171 lb) (With shoes.)  Height: 175.3 cm (5' 9\")                                           Medical History  Surgical History Family History Social History   Past " Medical History:   Diagnosis Date     GI (gastrointestinal bleed)      Olecranon bursitis     Past Surgical History:   Procedure Laterality Date     COLONOSCOPY N/A 7/3/2017    Procedure: COLONOSCOPY with NS:EPINEPHRINE INJECTION AND PLACEMENT OF 3 CLIPS;  Surgeon: Naren Mancia MD;  Location: [facility];  Service:      LAMINECTOMY  2007    No family history on file. Social History     Socioeconomic History     Marital status:      Spouse name: Not on file     Number of children: Not on file     Years of education: Not on file     Highest education level: Not on file   Occupational History     Not on file   Tobacco Use     Smoking status: Never Smoker     Smokeless tobacco: Never Used   Substance and Sexual Activity     Alcohol use: Yes     Alcohol/week: 4.0 standard drinks     Drug use: No     Sexual activity: Not on file   Other Topics Concern     Not on file   Social History Narrative     Not on file     Social Determinants of Health     Financial Resource Strain:      Difficulty of Paying Living Expenses:    Food Insecurity:      Worried About Running Out of Food in the Last Year:      Ran Out of Food in the Last Year:    Transportation Needs:      Lack of Transportation (Medical):      Lack of Transportation (Non-Medical):    Physical Activity:      Days of Exercise per Week:      Minutes of Exercise per Session:    Stress:      Feeling of Stress :    Social Connections:      Frequency of Communication with Friends and Family:      Frequency of Social Gatherings with Friends and Family:      Attends [employer] Services:      Active Member of Clubs or Organizations:      Attends Club or Organization Meetings:      Marital Status:    Intimate Partner Violence:      Fear of Current or Ex-Partner:      Emotionally Abused:      Physically Abused:      Sexually Abused:           Medications  Allergies   Current Outpatient Medications   Medication Sig Dispense Refill     aspirin (ASA) 81 MG chewable  tablet Take 81 mg by mouth daily       azelastine (OPTIVAR) 0.05 % ophthalmic solution Place 1 drop into both eyes daily as needed       BEPREVE 1.5 % SOLN Place 1 drop into both eyes daily as needed       glucosamine-chondroitin 500-400 MG CAPS per capsule Take 1 capsule by mouth daily       lisinopril (ZESTRIL) 10 MG tablet Take 2 tablets by mouth daily       multivitamin w/minerals (MULTIVITAMIN, THERAPEUTIC WITH MINERALS) tablet Take 1 tablet by mouth daily       NO ACTIVE MEDICATIONS        rosuvastatin (CRESTOR) 5 MG tablet Take 1 tablet by mouth daily       acetaminophen-codeine 300-30 MG per tablet Take 0.5-2 tablets by mouth 2 times daily as needed for pain. 20 tablet 0    No Known Allergies      Lab Results    Chemistry/lipid CBC Cardiac Enzymes/BNP/TSH/INR   Lab Results   Component Value Date    CHOL 210 (H) 04/20/2021    HDL 59 04/20/2021    TRIG 74 04/20/2021    BUN 20 04/20/2021     04/20/2021    CO2 27 04/20/2021    Lab Results   Component Value Date    WBC 4.7 04/20/2021    HGB 15.0 04/20/2021    HCT 42.9 04/20/2021    MCV 94 04/20/2021     04/20/2021    Lab Results   Component Value Date    TSH 1.10 04/20/2021

## 2021-09-02 ENCOUNTER — HOSPITAL ENCOUNTER (OUTPATIENT)
Dept: CARDIOLOGY | Facility: CLINIC | Age: 66
Discharge: HOME | End: 2021-09-02
Attending: INTERNAL MEDICINE | Admitting: INTERNAL MEDICINE
Payer: MEDICARE

## 2021-09-02 DIAGNOSIS — Q23.81 BICUSPID AORTIC VALVE: ICD-10-CM

## 2021-09-02 DIAGNOSIS — I10 ESSENTIAL HYPERTENSION, BENIGN: ICD-10-CM

## 2021-09-02 LAB — LVEF ECHO: NORMAL

## 2021-09-02 PROCEDURE — 93306 TTE W/DOPPLER COMPLETE: CPT

## 2021-09-02 PROCEDURE — 93306 TTE W/DOPPLER COMPLETE: CPT | Mod: 26 | Performed by: INTERNAL MEDICINE

## 2021-09-04 ENCOUNTER — HEALTH MAINTENANCE LETTER (OUTPATIENT)
Age: 66
End: 2021-09-04

## 2021-09-07 DIAGNOSIS — R06.02 SHORTNESS OF BREATH: Primary | ICD-10-CM

## 2021-09-07 DIAGNOSIS — R93.1 ABNORMAL FINDINGS ON DIAGNOSTIC IMAGING OF HEART AND CORONARY CIRCULATION: ICD-10-CM

## 2021-09-07 DIAGNOSIS — Q23.81 BICUSPID AORTIC VALVE: ICD-10-CM

## 2021-09-07 DIAGNOSIS — I10 ESSENTIAL HYPERTENSION, BENIGN: ICD-10-CM

## 2021-09-16 ENCOUNTER — HOSPITAL ENCOUNTER (OUTPATIENT)
Dept: CARDIOLOGY | Facility: CLINIC | Age: 66
Discharge: HOME | End: 2021-09-16
Attending: INTERNAL MEDICINE | Admitting: INTERNAL MEDICINE
Payer: MEDICARE

## 2021-09-16 DIAGNOSIS — I10 ESSENTIAL HYPERTENSION, BENIGN: ICD-10-CM

## 2021-09-16 DIAGNOSIS — R06.02 SHORTNESS OF BREATH: ICD-10-CM

## 2021-09-16 DIAGNOSIS — Q23.81 BICUSPID AORTIC VALVE: ICD-10-CM

## 2021-09-16 PROCEDURE — 93321 DOPPLER ECHO F-UP/LMTD STD: CPT | Mod: TC

## 2021-09-16 PROCEDURE — 93016 CV STRESS TEST SUPVJ ONLY: CPT | Performed by: INTERNAL MEDICINE

## 2021-09-16 PROCEDURE — 93350 STRESS TTE ONLY: CPT | Mod: TC

## 2021-09-16 PROCEDURE — 93321 DOPPLER ECHO F-UP/LMTD STD: CPT | Mod: 26 | Performed by: INTERNAL MEDICINE

## 2021-09-16 PROCEDURE — 93018 CV STRESS TEST I&R ONLY: CPT | Performed by: INTERNAL MEDICINE

## 2021-09-16 PROCEDURE — 93325 DOPPLER ECHO COLOR FLOW MAPG: CPT | Mod: 26 | Performed by: INTERNAL MEDICINE

## 2021-09-16 PROCEDURE — 93350 STRESS TTE ONLY: CPT | Mod: 26 | Performed by: INTERNAL MEDICINE

## 2021-10-02 ENCOUNTER — HOSPITAL ENCOUNTER (OUTPATIENT)
Dept: MRI IMAGING | Facility: HOSPITAL | Age: 66
End: 2021-10-02
Attending: INTERNAL MEDICINE
Payer: MEDICARE

## 2021-10-02 DIAGNOSIS — Q23.81 BICUSPID AORTIC VALVE: ICD-10-CM

## 2021-10-02 DIAGNOSIS — R93.1 ABNORMAL FINDINGS ON DIAGNOSTIC IMAGING OF HEART AND CORONARY CIRCULATION: ICD-10-CM

## 2021-10-02 PROCEDURE — 255N000002 HC RX 255 OP 636: Performed by: INTERNAL MEDICINE

## 2021-10-02 PROCEDURE — 71555 MRI ANGIO CHEST W OR W/O DYE: CPT | Mod: ME

## 2021-10-02 PROCEDURE — A9585 GADOBUTROL INJECTION: HCPCS | Performed by: INTERNAL MEDICINE

## 2021-10-02 PROCEDURE — 71552 MRI CHEST W/O & W/DYE: CPT

## 2021-10-02 RX ORDER — GADOBUTROL 604.72 MG/ML
10 INJECTION INTRAVENOUS ONCE
Status: COMPLETED | OUTPATIENT
Start: 2021-10-02 | End: 2021-10-02

## 2021-10-02 RX ADMIN — GADOBUTROL 10 ML: 604.72 INJECTION INTRAVENOUS at 06:33

## 2021-10-13 DIAGNOSIS — E78.2 MIXED HYPERLIPIDEMIA: Primary | ICD-10-CM

## 2021-10-15 ENCOUNTER — LAB (OUTPATIENT)
Dept: LAB | Facility: CLINIC | Age: 66
End: 2021-10-15
Payer: MEDICARE

## 2021-10-15 DIAGNOSIS — E78.2 MIXED HYPERLIPIDEMIA: ICD-10-CM

## 2021-10-15 LAB
CHOLEST SERPL-MCNC: 179 MG/DL
FASTING STATUS PATIENT QL REPORTED: YES
HDLC SERPL-MCNC: 67 MG/DL
LDLC SERPL CALC-MCNC: 90 MG/DL
TRIGL SERPL-MCNC: 112 MG/DL

## 2021-10-15 PROCEDURE — 36415 COLL VENOUS BLD VENIPUNCTURE: CPT

## 2021-10-15 PROCEDURE — 80061 LIPID PANEL: CPT

## 2021-10-30 ENCOUNTER — HEALTH MAINTENANCE LETTER (OUTPATIENT)
Age: 66
End: 2021-10-30

## 2021-11-29 ENCOUNTER — TRANSFERRED RECORDS (OUTPATIENT)
Dept: HEALTH INFORMATION MANAGEMENT | Facility: CLINIC | Age: 66
End: 2021-11-29
Payer: MEDICARE

## 2022-02-10 DIAGNOSIS — I10 ESSENTIAL HYPERTENSION: Primary | ICD-10-CM

## 2022-02-14 RX ORDER — ROSUVASTATIN CALCIUM 5 MG/1
TABLET, COATED ORAL
Qty: 90 TABLET | Refills: 11 | Status: SHIPPED | OUTPATIENT
Start: 2022-02-14 | End: 2022-05-24

## 2022-02-14 NOTE — TELEPHONE ENCOUNTER
"Outpatient Medication Detail     Disp Refills Start End MINDY   rosuvastatin (CRESTOR) 5 MG tablet 90 tablet 3 4/22/2021  --   Sig - Route: Take 1 tablet (5 mg total) by mouth at bedtime. - Oral   Sent to pharmacy as: rosuvastatin 5 mg tablet (Crestor)   E-Prescribing Status: Receipt confirmed by pharmacy (4/22/2021  4:25 PM CDT)       rosuvastatin (CRESTOR) 5 MG tablet [[id number]]    Electronically signed by: Vinh Rdz MD on 04/22/21 1625 Status: Active   Ordering user: Vinh Rdz MD 04/22/21 1625 Authorized by: Vinh Rdz MD   Frequency: QHS 04/22/21 - Until Discontinued Released by: Vinh Rdz MD 04/22/21 1625   Diagnoses  Metabolic syndrome [E88.81]     Routing refill request to provider for review/approval because:  Early refill request with pharmacy change.    Last office visit provider:  4/20/21     Requested Prescriptions   Pending Prescriptions Disp Refills     rosuvastatin (CRESTOR) 5 MG tablet [Pharmacy Med Name: ROSUVASTATIN 5MG TABLETS] 90 tablet      Sig: TAKE 1 TABLET(5 MG) BY MOUTH AT BEDTIME       Statins Protocol Passed - 2/14/2022  2:04 PM        Passed - LDL on file in past 12 months     Recent Labs   Lab Test 10/15/21  0712   LDL 90             Passed - No abnormal creatine kinase in past 12 months     No lab results found.             Passed - Recent (12 mo) or future (30 days) visit within the authorizing provider's specialty     Patient has had an office visit with the authorizing provider or a provider within the authorizing providers department within the previous 12 mos or has a future within next 30 days. See \"Patient Info\" tab in inbasket, or \"Choose Columns\" in Meds & Orders section of the refill encounter.              Passed - Medication is active on med list        Passed - Patient is age 18 or older             Doug Rivera RN 02/14/22 2:05 PM  "

## 2022-05-17 ENCOUNTER — OFFICE VISIT (OUTPATIENT)
Dept: INTERNAL MEDICINE | Facility: CLINIC | Age: 67
End: 2022-05-17
Payer: MEDICARE

## 2022-05-17 VITALS
HEIGHT: 67 IN | RESPIRATION RATE: 17 BRPM | HEART RATE: 60 BPM | DIASTOLIC BLOOD PRESSURE: 90 MMHG | TEMPERATURE: 97.8 F | OXYGEN SATURATION: 99 % | WEIGHT: 175 LBS | SYSTOLIC BLOOD PRESSURE: 170 MMHG | BODY MASS INDEX: 27.47 KG/M2

## 2022-05-17 DIAGNOSIS — Z00.00 ROUTINE GENERAL MEDICAL EXAMINATION AT A HEALTH CARE FACILITY: Primary | ICD-10-CM

## 2022-05-17 DIAGNOSIS — Z12.5 SCREENING FOR PROSTATE CANCER: ICD-10-CM

## 2022-05-17 LAB
ALBUMIN SERPL-MCNC: 4.1 G/DL (ref 3.5–5)
ALBUMIN UR-MCNC: NEGATIVE MG/DL
ALP SERPL-CCNC: 84 U/L (ref 45–120)
ALT SERPL W P-5'-P-CCNC: 27 U/L (ref 0–45)
ANION GAP SERPL CALCULATED.3IONS-SCNC: 8 MMOL/L (ref 5–18)
APPEARANCE UR: CLEAR
AST SERPL W P-5'-P-CCNC: 22 U/L (ref 0–40)
BILIRUB SERPL-MCNC: 1.2 MG/DL (ref 0–1)
BILIRUB UR QL STRIP: NEGATIVE
BUN SERPL-MCNC: 18 MG/DL (ref 8–22)
CALCIUM SERPL-MCNC: 9.6 MG/DL (ref 8.5–10.5)
CHLORIDE BLD-SCNC: 102 MMOL/L (ref 98–107)
CHOLEST SERPL-MCNC: 161 MG/DL
CO2 SERPL-SCNC: 29 MMOL/L (ref 22–31)
COLOR UR AUTO: YELLOW
CREAT SERPL-MCNC: 0.74 MG/DL (ref 0.7–1.3)
ERYTHROCYTE [DISTWIDTH] IN BLOOD BY AUTOMATED COUNT: 11.8 % (ref 10–15)
FASTING STATUS PATIENT QL REPORTED: NORMAL
GFR SERPL CREATININE-BSD FRML MDRD: >90 ML/MIN/1.73M2
GLUCOSE BLD-MCNC: 88 MG/DL (ref 70–125)
GLUCOSE UR STRIP-MCNC: NEGATIVE MG/DL
HCT VFR BLD AUTO: 43.4 % (ref 40–53)
HDLC SERPL-MCNC: 56 MG/DL
HGB BLD-MCNC: 15.1 G/DL (ref 13.3–17.7)
HGB UR QL STRIP: NEGATIVE
KETONES UR STRIP-MCNC: NEGATIVE MG/DL
LDLC SERPL CALC-MCNC: 89 MG/DL
LEUKOCYTE ESTERASE UR QL STRIP: NEGATIVE
MCH RBC QN AUTO: 32.5 PG (ref 26.5–33)
MCHC RBC AUTO-ENTMCNC: 34.8 G/DL (ref 31.5–36.5)
MCV RBC AUTO: 94 FL (ref 78–100)
NITRATE UR QL: NEGATIVE
PH UR STRIP: 7 [PH] (ref 5–8)
PLATELET # BLD AUTO: 169 10E3/UL (ref 150–450)
POTASSIUM BLD-SCNC: 4.3 MMOL/L (ref 3.5–5)
PROT SERPL-MCNC: 7.1 G/DL (ref 6–8)
PSA SERPL-MCNC: 3.97 UG/L (ref 0–4.5)
RBC # BLD AUTO: 4.64 10E6/UL (ref 4.4–5.9)
SODIUM SERPL-SCNC: 139 MMOL/L (ref 136–145)
SP GR UR STRIP: 1.02 (ref 1–1.03)
TRIGL SERPL-MCNC: 79 MG/DL
TSH SERPL DL<=0.005 MIU/L-ACNC: 1.46 UIU/ML (ref 0.3–5)
UROBILINOGEN UR STRIP-ACNC: 0.2 E.U./DL
WBC # BLD AUTO: 4.5 10E3/UL (ref 4–11)

## 2022-05-17 PROCEDURE — 80053 COMPREHEN METABOLIC PANEL: CPT | Performed by: INTERNAL MEDICINE

## 2022-05-17 PROCEDURE — 36415 COLL VENOUS BLD VENIPUNCTURE: CPT | Performed by: INTERNAL MEDICINE

## 2022-05-17 PROCEDURE — G0103 PSA SCREENING: HCPCS | Performed by: INTERNAL MEDICINE

## 2022-05-17 PROCEDURE — 84443 ASSAY THYROID STIM HORMONE: CPT | Performed by: INTERNAL MEDICINE

## 2022-05-17 PROCEDURE — G0402 INITIAL PREVENTIVE EXAM: HCPCS | Performed by: INTERNAL MEDICINE

## 2022-05-17 PROCEDURE — 85027 COMPLETE CBC AUTOMATED: CPT | Performed by: INTERNAL MEDICINE

## 2022-05-17 PROCEDURE — 81003 URINALYSIS AUTO W/O SCOPE: CPT | Performed by: INTERNAL MEDICINE

## 2022-05-17 PROCEDURE — 80061 LIPID PANEL: CPT | Performed by: INTERNAL MEDICINE

## 2022-05-17 RX ORDER — CHLORAL HYDRATE 500 MG
1 CAPSULE ORAL DAILY
COMMUNITY

## 2022-05-17 RX ORDER — METOPROLOL SUCCINATE 25 MG/1
25 TABLET, EXTENDED RELEASE ORAL 2 TIMES DAILY
COMMUNITY
Start: 2022-04-12

## 2022-05-17 ASSESSMENT — PAIN SCALES - GENERAL: PAINLEVEL: MILD PAIN (3)

## 2022-05-17 ASSESSMENT — ACTIVITIES OF DAILY LIVING (ADL): CURRENT_FUNCTION: NO ASSISTANCE NEEDED

## 2022-05-17 NOTE — PROGRESS NOTES
"{RN A&P DOCUMENTATION AWV:952359}    Subjective   SUBJECTIVE:   Yayo Larkin is a 66 year old male who presents for Preventive Visit.    {  Patient has been advised of split billing requirements and indicates understanding: Yes  Are you in the first 12 months of your Me  Both Eyes  20/25:     Healthy Habits:     In general, how would you rate your overall health?  Good    Frequency of exercise:  4-5 days/week    Duration of exercise:  30-45 minutes    Do you usually eat at least 4 servings of fruit and vegetables a day, include whole grains    & fiber and avoid regularly eating high fat or \"junk\" foods?  Yes    Taking medications regularly:  Yes    Medication side effects:  None    Ability to successfully perform activities of daily living:  No assistance needed    Home Safety:  No safety concerns identified    Hearing Impairment:  No hearing concerns    In the past 6 months, have you been bothered by leaking of urine?  No    In general, how would you rate your overall mental or emotional health?  Good      PHQ-2 Total Score: 0    Additional concerns today:  No    Do you feel safe in your environment? Yes    Have you ever done Advance Care Planning? (For example, a Health Directive, POLST, or a discussion with a medical provider or your loved ones about your wishes): Yes, patient states has an Advance Care Planning document and will bring a copy to the clinic.       Fall risk  none    {If any of the above assessments are answered yes, consider ordering appropriate referrals (Optional):530566::\"click delete button to remove this line now\"}  Cognitive clock   Two   Words   Picture  Garden and captain    Do you have sleep apnea, excessive snoring or daytime drowsiness?: no    Reviewed and updated as needed this visit by clinical staff                    Reviewed and updated as needed this visit by Provider                   Social History     Tobacco Use     Smoking status: Never Smoker     Smokeless tobacco: " Never Used   Substance Use Topics     Alcohol use: Yes     Alcohol/week: 4.0 standard drinks     {Rooming Staff- Complete this question if Prescreen response is not shown below for today's visit. If you drink alcohol do you typically have >3 drinks per day or >7 drinks per week? (Optional):477086}    No flowsheet data found.{add AUDIT responses (Optional) (A score of 7 for adult men is an indication of hazardous drinking; a score of 8 or more is an indication of an alcohol use disorder.  A score of 7 or more for adult women is an indication of hazardous drinking or an alchohol use disorder):082860}    {Outside tests to abstract? :175049}    {additional problems to add (Optional):076676}    Current providers sharing in care for this patient include: {Rooming staff:  Please update Care Team in Rooming Activity, refresh this note and then delete this statement}  Patient Care Team:  Vinh Rdz MD as PCP - General  Vinh Rdz MD as Assigned PCP  Jarrell Perkins MD as Assigned Heart and Vascular Provider    The following health maintenance items are reviewed in Epic and correct as of today:  Health Maintenance Due   Topic Date Due     ANNUAL REVIEW OF HM ORDERS  Never done     ADVANCE CARE PLANNING  Never done     Pneumococcal Vaccine: Pediatrics (0 to 5 Years) and At-Risk Patients (6 to 64 Years) (1 - PCV) Never done     Pneumococcal Vaccine: 65+ Years (1 - PCV) Never done     HIV SCREENING  Never done     HEPATITIS C SCREENING  Never done     DTAP/TDAP/TD IMMUNIZATION (2 - Td or Tdap) 02/25/2020     ZOSTER IMMUNIZATION (3 of 3) 06/09/2020     COVID-19 Vaccine (3 - Booster for Pfizer series) 09/07/2021     {Chronicprobdata (optional):224928}  {Decision Support (Optional):454285}        Review of Systems  {ROS COMP (Optional):239709}    OBJECTIVE:   There were no vitals taken for this visit. Estimated body mass index is 25.25 kg/m  as calculated from the following:    Height as of 8/24/21: 1.753 m  "(5' 9\").    Weight as of 21: 77.6 kg (171 lb).  Physical Exam  {Exam (Optional) :977108}    {Diagnostic Test Results (Optional):125481::\"Diagnostic Test Results:\",\"Labs reviewed in Epic\"}    ASSESSMENT / PLAN:   {Diag Picklist:566557}    {Patient advised of split billing (Optional):385999}    COUNSELING:  {Medicare Counselin}    Estimated body mass index is 25.25 kg/m  as calculated from the following:    Height as of 21: 1.753 m (5' 9\").    Weight as of 21: 77.6 kg (171 lb).    {Weight Management Plan (ACO) Complete if BMI is abnormal-  Ages 18-64  BMI >24.9.  Age 65+ with BMI <23 or >30 (Optional):713619}    He reports that he has never smoked. He has never used smokeless tobacco.      Appropriate preventive services were discussed with this patient, including applicable screening as appropriate for cardiovascular disease, diabetes, osteopenia/osteoporosis, and glaucoma.  As appropriate for age/gender, discussed screening for colorectal cancer, prostate cancer, breast cancer, and cervical cancer. Checklist reviewing preventive services available has been given to the patient.    Reviewed patients plan of care and provided an AVS. The {CarePlan:074297} for Yayo meets the Care Plan requirement. This Care Plan has been established and reviewed with the {PATIENT, FAMILY MEMBER, CAREGIVER:725939}.    Counseling Resources:  ATP IV Guidelines  Pooled Cohorts Equation Calculator  Breast Cancer Risk Calculator  Breast Cancer: Medication to Reduce Risk  FRAX Risk Assessment  ICSI Preventive Guidelines  Dietary Guidelines for Americans,   [employer]'s MyPlate  ASA Prophylaxis  Lung CA Screening    Vinh Rdz MD  [facility]    Identified Health Risks:                    Social History     Tobacco Use     Smoking status: Never Smoker     Smokeless tobacco: Never Used   Substance Use Topics     Alcohol use: Yes     Alcohol/week: 4.0 standard drinks       Current providers " "sharing in care for this patient include:   Patient Care Team:  Vinh Rdz MD as PCP - General  Vinh Rdz MD as Assigned PCP  Jarrell Perkins MD as Assigned Heart and Vascular Provider    The following health maintenance items are reviewed in Epic and correct as of today:  Health Maintenance Due   Topic Date Due     ANNUAL REVIEW OF HM ORDERS  Never done     ADVANCE CARE PLANNING  Never done     Pneumococcal Vaccine: Pediatrics (0 to 5 Years) and At-Risk Patients (6 to 64 Years) (1 - PCV) Never done     Pneumococcal Vaccine: 65+ Years (1 - PCV) Never done     HIV SCREENING  Never done     HEPATITIS C SCREENING  Never done     DTAP/TDAP/TD IMMUNIZATION (2 - Td or Tdap) 02/25/2020     ZOSTER IMMUNIZATION (3 of 3) 06/09/2020     COVID-19 Vaccine (3 - Booster for Pfizer series) 09/07/2021               Objective    There were no vitals taken for this visit. Estimated body mass index is 25.25 kg/m  as calculated from the following:    Height as of 8/24/21: 1.753 m (5' 9\").    Weight as of 8/24/21: 77.6 kg (171 lb).  Physical Exam  Patient appears comfortable and in no acute distress.        Identified Health Risks:  "

## 2022-05-17 NOTE — LETTER
May 19, 2022      Yayo Larkin  [address] [facility] [address]        Dear ,    We are writing to inform you of your test results.    All very good    Resulted Orders   CBC with platelets   Result Value Ref Range    WBC Count 4.5 4.0 - 11.0 10e3/uL    RBC Count 4.64 4.40 - 5.90 10e6/uL    Hemoglobin 15.1 13.3 - 17.7 g/dL    Hematocrit 43.4 40.0 - 53.0 %    MCV 94 78 - 100 fL    MCH 32.5 26.5 - 33.0 pg    MCHC 34.8 31.5 - 36.5 g/dL    RDW 11.8 10.0 - 15.0 %    Platelet Count 169 150 - 450 10e3/uL   Comprehensive metabolic panel   Result Value Ref Range    Sodium 139 136 - 145 mmol/L    Potassium 4.3 3.5 - 5.0 mmol/L    Chloride 102 98 - 107 mmol/L    Carbon Dioxide (CO2) 29 22 - 31 mmol/L    Anion Gap 8 5 - 18 mmol/L    Urea Nitrogen 18 8 - 22 mg/dL    Creatinine 0.74 0.70 - 1.30 mg/dL    Calcium 9.6 8.5 - 10.5 mg/dL    Glucose 88 70 - 125 mg/dL    Alkaline Phosphatase 84 45 - 120 U/L    AST 22 0 - 40 U/L    ALT 27 0 - 45 U/L    Protein Total 7.1 6.0 - 8.0 g/dL    Albumin 4.1 3.5 - 5.0 g/dL    Bilirubin Total 1.2 (H) 0.0 - 1.0 mg/dL    GFR Estimate >90 >60 mL/min/1.73m2      Comment:      Effective December 21, 2021 eGFRcr in adults is calculated using the 2021 CKD-EPI creatinine equation which includes age and gender (Angeles et al., NEJM, DOI: 10.1056/GWNNku0562696)   TSH   Result Value Ref Range    TSH 1.46 0.30 - 5.00 uIU/mL   Prostate Specific Antigen Screen   Result Value Ref Range    Prostate Specific Antigen Screen 3.97 0.00 - 4.50 ug/L    Narrative    Assay Method is Abbott Prostate-Specific Antigen (PSA)  Standard-WHO 1st International (90:10)   Lipid panel reflex to direct LDL Fasting   Result Value Ref Range    Cholesterol 161 <=199 mg/dL    Triglycerides 79 <=149 mg/dL    Direct Measure HDL 56 >=40 mg/dL      Comment:      HDL Cholesterol Reference Range:     0-2 years:   No reference ranges established for patients under 2 years old  at [facility] for lipid  analytes.    2-8 years:  Greater than 45 mg/dL     18 years and older:   Female: Greater than or equal to 50 mg/dL   Male:   Greater than or equal to 40 mg/dL    LDL Cholesterol Calculated 89 <=129 mg/dL    Patient Fasting > 8hrs? Unknown    UA reflex to Microscopic and Culture   Result Value Ref Range    Color Urine Yellow Colorless, Straw, Light Yellow, Yellow    Appearance Urine Clear Clear    Glucose Urine Negative Negative mg/dL    Bilirubin Urine Negative Negative    Ketones Urine Negative Negative mg/dL    Specific Gravity Urine 1.025 1.005 - 1.030    Blood Urine Negative Negative    pH Urine 7.0 5.0 - 8.0    Protein Albumin Urine Negative Negative mg/dL    Urobilinogen Urine 0.2 0.2, 1.0 E.U./dL    Nitrite Urine Negative Negative    Leukocyte Esterase Urine Negative Negative    Narrative    Microscopic not indicated       If you have any questions or concerns, please call the clinic at the number listed above.       Sincerely,      Vinh Rdz MD

## 2022-05-17 NOTE — PROGRESS NOTES
Annual Wellness Visit:  Yayo Larkin  is a 66 year old male  who presents for an annual wellness visit.  Annual wellness visit and physical examination fasting today.  Check hemogram comprehensive metabolic profile urinalysis lipid panel PSA TSH.        coGnitive assessment done along with PATIENT SHARING AND ALSO ADVANCED CARE PLANNING DONE.   Recent eye examination 2021.    Assessment/Plan:  Annual wellness visit and physical exam screen for prostate cancer.    Systolic ejection murmur from aortic stenosis asymptomatic without chest pain shortness of breath or exertional syncope at this time.  Has been seen by cardiology Dr. Anton Perkins also [place] cardiologist.  He lives most of the [place] of [facility].  Winter months.  Rapid heartbeat right arm numb happened 4 times.    Right eye 20/30 to left eye 20/25 both eyes 20/25.    Colonoscopy with Dr. Galarza 2020 hemorrhoids otherwise clear prior history of adenomatous colon polyps.    Subjective:   Medical History:    Non-smoker no excess alcohol.  Occupation .   now wife  of pneumococcal pneumonia 2 or 3 years ago prior history includes that of malignant melanoma from his back without sign of recurrence.    History of lumbar laminectomy for free fragment removal colonoscopy up-to-date he has received the COVID vaccines.  Chronic low back pain since low back surgery.  Epidural spinal injections 3/year first 1 is slated for 2 days hands.  No anesthetic complications from any prior surgeries.  Past Medical History:   Diagnosis Date     GI (gastrointestinal bleed)      Olecranon bursitis      Current Outpatient Medications   Medication     aspirin (ASA) 81 MG chewable tablet     azelastine (OPTIVAR) 0.05 % ophthalmic solution     fish oil-omega-3 fatty acids 1000 MG capsule     metoprolol succinate ER (TOPROL XL) 25 MG 24 hr tablet     multivitamin w/minerals (THERA-VIT-M) tablet     rosuvastatin  (CRESTOR) 5 MG tablet     BEPREVE 1.5 % SOLN     glucosamine-chondroitin 500-400 MG CAPS per capsule     lisinopril (ZESTRIL) 10 MG tablet     NO ACTIVE MEDICATIONS     No current facility-administered medications for this visit.     Immunization History   Administered Date(s) Administered     COVID-19,PF,Pfizer (12+ Yrs) 2021, 2021     DT (PEDS <7y) 2005     Flu, Unspecified 10/29/2008, 2014     Influenza (H1N1) 2010     Influenza (IIV3) PF 10/29/2008, 10/22/2011, 2012     Influenza Vaccine IM > 6 months Valent IIV4 (Alfuria,Fluzone) 10/16/2017, 2018, 10/02/2019, 10/16/2020     Influenza Vaccine, 6+MO IM (QUADRIVALENT W/PRESERVATIVES) 2014     Influenza, Quad, High Dose, Pf, 65yr+ (Fluzone HD) 10/09/2021     TD (ADULT, 7+) 2005     Td,adult,historic,unspecified 2005     Tdap (Adacel,Boostrix) 2010     Zoster Vaccine, Unspecified (historical) 2020     Zoster vaccine recombinant adjuvanted (SHINGRIX) 2020, 2020     Zoster vaccine, live 10/09/2017       Surgical History:  Past Surgical History:   Procedure Laterality Date     COLONOSCOPY N/A 7/3/2017    Procedure: COLONOSCOPY with NS:EPINEPHRINE INJECTION AND PLACEMENT OF 3 CLIPS;  Surgeon: Naren Mancia MD;  Location: [facility];  Service:      LAMINECTOMY          Family History:  Wife  in her late 50s of overwhelming sepsis from pneumococcal pneumonia 2017.  Mother still living in her late 80s soon-to-be 90.  Father  in his mid 50s myocardial infarction.  1 sister  in her 20s of rhabdomyosarcoma.    Social History:  Enjoys playing golf spends most of the winter months in [place] just [place] miles north of [facility].   and aphasia.  Has been hospitalized in [place] for cardiovascular problems seen by cardiologist there.  Not a candidate yet for aortic valve replacement.  The cardiologist he sees there in [place] is an  "interventional cardiologist.    Health Maintenances:  Immunizations reviewed and up-to-date colonoscopy is up-to-date he has received all 4 COVID vaccines.    Objective:  BP (!) 170/90 (BP Location: Right arm, Patient Position: Sitting)   Pulse 60   Temp 97.8  F (36.6  C)   Resp 17   Ht 1.708 m (5' 7.25\")   Wt 79.4 kg (175 lb)   SpO2 99%   BMI 27.21 kg/m    He is easily conversant good spirited he has mild dorsal thoracic kyphosis there is no spine tenderness skin negative lymph negative neuro negative psych normal head eyes ears nose throat negative bilateral ceruminosis noted not obstructing or impacted neck veins were nondistended there were no carotid bruits or thyromegaly no lymphadenopathy appreciated lymph bearing areas lungs were clear to auscultation posteriorly heart tones systolic ejection murmur with radiation to carotids grade 1/6 to 2/6 no diastolic component the rhythm was regular.  He is not in acute distress not toxic.  Does not appear acutely or chronically ill his neuromuscular tone is good.  Abdomen benign without organomegaly masses or tenderness no spleen or liver tip palpable bowel sounds are present but hypoactive genital examination was negative no groin hernias testicular scrotal exam negative prostate was normal to examination not enlarged or nodular rest the rectal exam negative good pulse noted in all 4 extremities no acrocyanosis clubbing or edema.    Vinh Rdz MD    Internal Medicine  Answers for HPI/ROS submitted by the patient on 5/17/2022  In general, how would you rate your overall physical health?: good  Frequency of exercise:: 4-5 days/week  Do you usually eat at least 4 servings of fruit and vegetables a day, include whole grains & fiber, and avoid regularly eating high fat or \"junk\" foods? : Yes  Taking medications regularly:: Yes  Medication side effects:: None  Activities of Daily Living: no assistance needed  Home safety: no safety concerns " identified  Hearing Impairment:: no hearing concerns  In the past 6 months, have you been bothered by leaking of urine?: No  In general, how would you rate your overall mental or emotional health?: good  Additional concerns today:: No  Duration of exercise:: 30-45 minutes

## 2022-05-24 DIAGNOSIS — I10 ESSENTIAL HYPERTENSION: ICD-10-CM

## 2022-05-24 RX ORDER — ROSUVASTATIN CALCIUM 5 MG/1
TABLET, COATED ORAL
Qty: 90 TABLET | Refills: 11 | Status: SHIPPED | OUTPATIENT
Start: 2022-05-24 | End: 2023-06-17

## 2022-10-22 ENCOUNTER — HEALTH MAINTENANCE LETTER (OUTPATIENT)
Age: 67
End: 2022-10-22

## 2022-10-27 ENCOUNTER — TELEPHONE (OUTPATIENT)
Dept: INTERNAL MEDICINE | Facility: CLINIC | Age: 67
End: 2022-10-27

## 2022-10-27 DIAGNOSIS — M54.9 BACK PAIN: Primary | ICD-10-CM

## 2022-10-27 NOTE — TELEPHONE ENCOUNTER
Order/Referral Request    Who is requesting: Patient    Orders being requested: Orders for Physical therapy    Reason service is needed/diagnosis: Patient stating he is having increasing issues with his lower back pain.    When are orders needed by: ASAP    Has this been discussed with Provider: Yes    Does patient have a preference on a Group/Provider/Facility? [facility].  Fax [phone]    Does patient have an appointment scheduled?: No    Where to send orders: Fax    Could we send this information to you in [place] or would you prefer to receive a phone call?:   Patient would prefer a phone call   Okay to leave a detailed message?: Yes at Home number on file [phone] (home)

## 2022-11-28 ENCOUNTER — TRANSFERRED RECORDS (OUTPATIENT)
Dept: HEALTH INFORMATION MANAGEMENT | Facility: CLINIC | Age: 67
End: 2022-11-28

## 2023-02-20 ENCOUNTER — TRANSFERRED RECORDS (OUTPATIENT)
Dept: HEALTH INFORMATION MANAGEMENT | Facility: CLINIC | Age: 68
End: 2023-02-20

## 2023-06-10 ASSESSMENT — ENCOUNTER SYMPTOMS
JOINT SWELLING: 0
MYALGIAS: 0
HEMATURIA: 0
FREQUENCY: 1
COUGH: 0
SORE THROAT: 0
PARESTHESIAS: 0
NAUSEA: 0
DIZZINESS: 0
CONSTIPATION: 0
FEVER: 0
ARTHRALGIAS: 1
ABDOMINAL PAIN: 0
DYSURIA: 0
NERVOUS/ANXIOUS: 0
PALPITATIONS: 0
CHILLS: 0
HEARTBURN: 0
DIARRHEA: 0
HEMATOCHEZIA: 0
EYE PAIN: 0
SHORTNESS OF BREATH: 0
HEADACHES: 0
WEAKNESS: 0

## 2023-06-10 ASSESSMENT — ACTIVITIES OF DAILY LIVING (ADL): CURRENT_FUNCTION: NO ASSISTANCE NEEDED

## 2023-06-13 ENCOUNTER — OFFICE VISIT (OUTPATIENT)
Dept: INTERNAL MEDICINE | Facility: CLINIC | Age: 68
End: 2023-06-13
Payer: MEDICARE

## 2023-06-13 VITALS
RESPIRATION RATE: 16 BRPM | OXYGEN SATURATION: 97 % | WEIGHT: 170.9 LBS | BODY MASS INDEX: 25.9 KG/M2 | HEART RATE: 55 BPM | DIASTOLIC BLOOD PRESSURE: 86 MMHG | TEMPERATURE: 97.6 F | HEIGHT: 68 IN | SYSTOLIC BLOOD PRESSURE: 170 MMHG

## 2023-06-13 DIAGNOSIS — Z00.00 ROUTINE GENERAL MEDICAL EXAMINATION AT A HEALTH CARE FACILITY: Primary | ICD-10-CM

## 2023-06-13 DIAGNOSIS — Z11.59 NEED FOR HEPATITIS C SCREENING TEST: ICD-10-CM

## 2023-06-13 DIAGNOSIS — Z23 NEED FOR DIPHTHERIA-TETANUS-PERTUSSIS (TDAP) VACCINE: ICD-10-CM

## 2023-06-13 DIAGNOSIS — Z00.00 ENCOUNTER FOR MEDICARE ANNUAL WELLNESS EXAM: ICD-10-CM

## 2023-06-13 DIAGNOSIS — Z12.5 SCREENING FOR PROSTATE CANCER: ICD-10-CM

## 2023-06-13 LAB
ALBUMIN SERPL BCG-MCNC: 4.4 G/DL (ref 3.5–5.2)
ALBUMIN UR-MCNC: NEGATIVE MG/DL
ALP SERPL-CCNC: 75 U/L (ref 40–129)
ALT SERPL W P-5'-P-CCNC: 22 U/L (ref 0–70)
ANION GAP SERPL CALCULATED.3IONS-SCNC: 9 MMOL/L (ref 7–15)
APPEARANCE UR: CLEAR
AST SERPL W P-5'-P-CCNC: 26 U/L (ref 0–45)
BILIRUB SERPL-MCNC: 0.7 MG/DL
BILIRUB UR QL STRIP: NEGATIVE
BUN SERPL-MCNC: 16.1 MG/DL (ref 8–23)
CALCIUM SERPL-MCNC: 9.7 MG/DL (ref 8.8–10.2)
CHLORIDE SERPL-SCNC: 105 MMOL/L (ref 98–107)
CHOLEST SERPL-MCNC: 152 MG/DL
COLOR UR AUTO: YELLOW
CREAT SERPL-MCNC: 0.81 MG/DL (ref 0.67–1.17)
DEPRECATED HCO3 PLAS-SCNC: 27 MMOL/L (ref 22–29)
ERYTHROCYTE [DISTWIDTH] IN BLOOD BY AUTOMATED COUNT: 12.2 % (ref 10–15)
GFR SERPL CREATININE-BSD FRML MDRD: >90 ML/MIN/1.73M2
GLUCOSE SERPL-MCNC: 95 MG/DL (ref 70–99)
GLUCOSE UR STRIP-MCNC: NEGATIVE MG/DL
HCT VFR BLD AUTO: 43 % (ref 40–53)
HDLC SERPL-MCNC: 66 MG/DL
HGB BLD-MCNC: 14.9 G/DL (ref 13.3–17.7)
HGB UR QL STRIP: NEGATIVE
KETONES UR STRIP-MCNC: NEGATIVE MG/DL
LDLC SERPL CALC-MCNC: 74 MG/DL
LEUKOCYTE ESTERASE UR QL STRIP: NEGATIVE
MCH RBC QN AUTO: 33.9 PG (ref 26.5–33)
MCHC RBC AUTO-ENTMCNC: 34.7 G/DL (ref 31.5–36.5)
MCV RBC AUTO: 98 FL (ref 78–100)
NITRATE UR QL: NEGATIVE
NONHDLC SERPL-MCNC: 86 MG/DL
PH UR STRIP: 6 [PH] (ref 5–8)
PLATELET # BLD AUTO: 166 10E3/UL (ref 150–450)
POTASSIUM SERPL-SCNC: 4.4 MMOL/L (ref 3.4–5.3)
PROT SERPL-MCNC: 7 G/DL (ref 6.4–8.3)
PSA SERPL DL<=0.01 NG/ML-MCNC: 3.62 NG/ML (ref 0–4.5)
RBC # BLD AUTO: 4.39 10E6/UL (ref 4.4–5.9)
SODIUM SERPL-SCNC: 141 MMOL/L (ref 136–145)
SP GR UR STRIP: 1.02 (ref 1–1.03)
TRIGL SERPL-MCNC: 60 MG/DL
TSH SERPL DL<=0.005 MIU/L-ACNC: 1.38 UIU/ML (ref 0.3–4.2)
UROBILINOGEN UR STRIP-ACNC: 0.2 E.U./DL
WBC # BLD AUTO: 4.4 10E3/UL (ref 4–11)

## 2023-06-13 PROCEDURE — 84443 ASSAY THYROID STIM HORMONE: CPT | Performed by: INTERNAL MEDICINE

## 2023-06-13 PROCEDURE — 81003 URINALYSIS AUTO W/O SCOPE: CPT | Performed by: INTERNAL MEDICINE

## 2023-06-13 PROCEDURE — G0103 PSA SCREENING: HCPCS | Performed by: INTERNAL MEDICINE

## 2023-06-13 PROCEDURE — 80053 COMPREHEN METABOLIC PANEL: CPT | Performed by: INTERNAL MEDICINE

## 2023-06-13 PROCEDURE — 80061 LIPID PANEL: CPT | Performed by: INTERNAL MEDICINE

## 2023-06-13 PROCEDURE — 36415 COLL VENOUS BLD VENIPUNCTURE: CPT | Performed by: INTERNAL MEDICINE

## 2023-06-13 PROCEDURE — G0438 PPPS, INITIAL VISIT: HCPCS | Performed by: INTERNAL MEDICINE

## 2023-06-13 PROCEDURE — 85027 COMPLETE CBC AUTOMATED: CPT | Performed by: INTERNAL MEDICINE

## 2023-06-13 RX ORDER — CANDESARTAN 8 MG/1
1 TABLET ORAL
COMMUNITY
Start: 2023-05-14

## 2023-06-13 NOTE — PROGRESS NOTES
Annual Wellness Visit:  Yayo Larkin  is a 67 year old male  who presents for an annual wellness visit.  Annual wellness visit and physical examination and screen for prostate cancer.  PSA plus LORENZO.  We had a good discussion.  Physician and patient sharing was accomplished.  Cognitive assessment was done and successfully completed by the patient.  Drawing the face of a clock plus remembering 3 words.  In addition the patient's provider list includes only this examiner.  NATALIYA FUNEZ.  There are no other providers at see this patient.    I do not prescribe this patient any opioids and the patient is not receiving opioids from any other provider or individual.    Today check hemogram comprehensive metabolic profile lipid panel PSA TSH urinalysis.    The patient has had issues with his lower back lumbago.  Prior history of lumbar laminectomy L3-L4.  The patient may consider getting acupuncture we did discuss core strengthening exercises.  Problems with insomnia and recommended melatonin 5 mg at bedtime with 1-2 Tylenol PM which may have an analgesic effect.  Stop any nonsteroidal anti-inflammatory drugs like Aleve PM.    Advance care planning done.    Falls risk assessment also accomplished.    Cognitive assessment was completed and the current provider this examiner and patient sharing was also completed.  Assessment/Plan:  Annual wellness visit and physical examination.  Screen for prostate cancer with PSA and LORENZO.    Subjective:   Medical History:    Followed by cardiology on an annual basis for aortic stenosis remains asymptomatic without chest pain shortness of breath palpitations or exertional syncope.  Murmur radiates to the carotids left side more than right.    Non-smoker    Alcohol intake light social.    Spends the winter months in [place].  Enjoys playing golf no known drug allergies.    Prior history of lumbar laminectomy L3-L4.    History of hypertension and hyperlipidemia but no target organ damage related  to same.  Medication list reviewed and reconciled in the chart generally well-tolerated.  Past Medical History:   Diagnosis Date     Arthritis      Cancer (H)      GI (gastrointestinal bleed)      Heart disease      Hypertension      Olecranon bursitis      Current Outpatient Medications   Medication     aspirin (ASA) 81 MG chewable tablet     azelastine (OPTIVAR) 0.05 % ophthalmic solution     BEPREVE 1.5 % SOLN     candesartan (ATACAND) 8 MG tablet     fish oil-omega-3 fatty acids 1000 MG capsule     metoprolol succinate ER (TOPROL XL) 25 MG 24 hr tablet     multivitamin w/minerals (THERA-VIT-M) tablet     NO ACTIVE MEDICATIONS     rosuvastatin (CRESTOR) 5 MG tablet     No current facility-administered medications for this visit.     Immunization History   Administered Date(s) Administered     COVID-19 Bivalent 12+ (Pfizer) 09/22/2022     COVID-19 MONOVALENT 12+ (Pfizer) 03/17/2021, 04/07/2021     DT (PEDS <7y) 07/11/2005     FLUAD(HD)65+ QUAD 09/22/2022     Flu, Unspecified 10/29/2008, 11/24/2014     Influenza (H1N1) 01/20/2010     Influenza (IIV3) PF 10/29/2008, 10/22/2011, 12/04/2012     Influenza Vaccine 65+ (Fluzone HD) 10/09/2021     Influenza Vaccine >6 months (Alfuria,Fluzone) 10/16/2017, 09/17/2018, 10/02/2019, 10/16/2020     Influenza Vaccine, 6+MO IM (QUADRIVALENT W/PRESERVATIVES) 11/24/2014     TD,PF 7+ (Tenivac) 07/11/2005     TDAP (Adacel,Boostrix) 02/25/2010     Td,adult,historic,unspecified 07/11/2005     Zoster Vaccine, Unspecified (historical) 04/14/2020     Zoster recombinant adjuvanted (SHINGRIX) 01/21/2020, 04/14/2020     Zoster vaccine, live 10/09/2017       Surgical History:  Past Surgical History:   Procedure Laterality Date     COLONOSCOPY N/A 07/03/2017    Procedure: COLONOSCOPY with NS:EPINEPHRINE INJECTION AND PLACEMENT OF 3 CLIPS;  Surgeon: Naren Mancia MD;  Location: [facility];  Service:      LAMINECTOMY  01/01/2007     ORTHOPEDIC SURGERY  03/23    nerve ablation     "    Family History:  Unfortunately about 5 years ago the patient's wife  of overwhelming pneumococcal sepsis pneumonia.  She was in her late 50s when she passed.    Mother living at 91.    Mother  cardiac at age 50.  Children and grandchildren well.  Retired [employer] official.    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  License in this occupation until 2024.    Health Maintenances:  Immunizations reviewed and up-to-date as is colonoscopy.    Objective:  BP (!) 170/86 (BP Location: Right arm, Patient Position: Sitting, Cuff Size: Adult Regular)   Pulse 55   Temp 97.6  F (36.4  C) (Oral)   Resp 16   Ht 1.715 m (5' 7.5\")   Wt 77.5 kg (170 lb 14.4 oz)   SpO2 97%   BMI 26.37 kg/m    Skin negative lymph negative neuro negative back straight no severe spine tenderness chest clear heart tones normal systolic ejection murmur noted at the base no diastolic murmur no S3 the rhythm was regular.  The patient is followed by cardiology for aortic stenosis.  There is no diastolic murmur and the second heart sound is well-preserved.  He is asymptomatic in this regard without chest pain shortness of breath palpitations or exertional syncope abdomen benign genital rectal exam negative small prostate without nodularity induration rest of rectal exam negative testicular scrotal exam negative no groin hernias extremities are free of edema cyanosis or clubbing good pulse noted in all 4 extremities.  We had a good discussion he is well.    Vinh Rdz MD    Internal Medicine  Answers for HPI/ROS submitted by the patient on 6/10/2023  In general, how would you rate your overall physical health?: good  Frequency of exercise:: 4-5 days/week  Do you usually eat at least 4 servings of fruit and vegetables a day, include whole grains & fiber, and avoid regularly eating high fat or \"junk\" foods? : Yes  Taking medications regularly:: " Yes  Medication side effects:: None  Activities of Daily Living: no assistance needed  Home safety: no safety concerns identified  Hearing Impairment:: no hearing concerns  In the past 6 months, have you been bothered by leaking of urine?: No  abdominal pain: No  Blood in stool: No  Blood in urine: No  chest pain: No  chills: No  congestion: No  constipation: No  cough: No  diarrhea: No  dizziness: No  ear pain: No  eye pain: No  nervous/anxious: No  fever: No  frequency: Yes  genital sores: No  headaches: No  hearing loss: No  heartburn: No  arthralgias: Yes  joint swelling: No  peripheral edema: No  mood changes: No  myalgias: No  nausea: No  dysuria: No  palpitations: No  Skin sensation changes: No  sore throat: No  urgency: No  rash: No  shortness of breath: No  visual disturbance: No  weakness: No  impotence: No  penile discharge: No  In general, how would you rate your overall mental or emotional health?: good  Additional concerns today:: Yes  Duration of exercise:: 45-60 minutes

## 2023-06-13 NOTE — PATIENT INSTRUCTIONS
Patient Education   Personalized Prevention Plan  You are due for the preventive services outlined below.  Your care team is available to assist you in scheduling these services.  If you have already completed any of these items, please share that information with your care team to update in your medical record.  Health Maintenance Due   Topic Date Due     ANNUAL REVIEW OF HM ORDERS  Never done     Discuss Advance Care Planning  Never done     Hepatitis C Screening  Never done     Diptheria Tetanus Pertussis (DTAP/TDAP/TD) Vaccine (2 - Td or Tdap) 02/25/2020     Pneumococcal Vaccine (1 - PCV) Never done     COVID-19 Vaccine (4 - Pfizer series) 01/22/2023

## 2023-06-17 DIAGNOSIS — I10 ESSENTIAL HYPERTENSION: ICD-10-CM

## 2023-06-17 RX ORDER — ROSUVASTATIN CALCIUM 5 MG/1
TABLET, COATED ORAL
Qty: 90 TABLET | Refills: 3 | Status: SHIPPED | OUTPATIENT
Start: 2023-06-17 | End: 2024-06-17

## 2023-06-18 NOTE — TELEPHONE ENCOUNTER
"Last Written Prescription Date:  5/24/22  Last Fill Quantity: 90,  # refills: 11   Last office visit provider:  6/13/23     Requested Prescriptions   Pending Prescriptions Disp Refills     rosuvastatin (CRESTOR) 5 MG tablet 90 tablet 3     Sig: TAKE 1 TABLET(5 MG) BY MOUTH AT BEDTIME       Statins Protocol Passed - 6/17/2023  6:57 PM        Passed - LDL on file in past 12 months     Recent Labs   Lab Test 06/13/23  0738   LDL 74             Passed - No abnormal creatine kinase in past 12 months     No lab results found.             Passed - Recent (12 mo) or future (30 days) visit within the authorizing provider's specialty     Patient has had an office visit with the authorizing provider or a provider within the authorizing providers department within the previous 12 mos or has a future within next 30 days. See \"Patient Info\" tab in inbasket, or \"Choose Columns\" in Meds & Orders section of the refill encounter.              Passed - Medication is active on med list        Passed - Patient is age 18 or older             Marely Pereira RN 06/17/23 9:00 PM  "

## 2023-09-26 NOTE — PROGRESS NOTES
Office Visit - Follow up    Yayo Larkin   61 y.o. male    Date of Visit: 7/10/2017    Chief Complaint   Patient presents with     ER Follow Up     GI Bleed       Subjective: Lower GI bleed after colonoscopy for removal of polyp.    Feels weak still tired offers no other new complaints on a multivitamin with iron supplement but no other supplemental iron.    No blood in stool or urine now medication list reviewed.  During hospital stay for acute blood loss anemia after colonoscopy and removal of polyp the patient did receive 1 unit of packed red cells.    Colonoscopy negative for cancer just an isolated benign polyp removed.    Medication list reviewed generally well-tolerated.    No blood in stool or urine now no melena.  Denies chest pain or shortness of breath.    ROS: A comprehensive review of systems was performed and was otherwise negative    Medications:  Prior to Admission medications    Medication Sig Start Date End Date Taking? Authorizing Provider   ascorbic acid, vitamin C, (VITAMIN C) 500 MG tablet Take 500 mg by mouth daily.   Yes PROVIDER, HISTORICAL   aspirin 81 mg chewable tablet Chew 81 mg daily.   Yes PROVIDER, HISTORICAL   cholecalciferol, vitamin D3, (VITAMIN D3) 1,000 unit capsule Take 1,000 Units by mouth daily.   Yes PROVIDER, HISTORICAL   DOCOSAHEXANOIC ACID/EPA (FISH OIL ORAL) Take 1,200 mg by mouth daily.   Yes PROVIDER, HISTORICAL   multivitamin with minerals (THERA-M) 9 mg iron-400 mcg Tab tablet Take 1 tablet by mouth daily.   Yes PROVIDER, HISTORICAL       Allergies: No Known Allergies    Immunizations:   Immunization History   Administered Date(s) Administered     DT (pediatric) 07/11/2005     Influenza, inj, historic 10/29/2008     Td, historic 07/11/2005     Tdap 02/25/2010       Exam Chest clear to auscultation and percussion.  Heart tones regular rhythm without murmur rub or gallop.  Abdomen soft nontender no organomegaly.  No peritoneal signs.  Extremities free of edema  no cyanosis or clubbing.  Neck veins nondistended no thyromegaly or scleral icterus noted, carotids full.  Skin warm and dry easily conversant good spirited.  Normal intelligence.  Neurologically intact no gross localizing findings.  Mild pallor.  Grief reaction at the loss of his wife on an acute basis to overwhelming pneumococcal sepsis\pneumonia.  [place].    Assessment and Plan  Lower GI bleed check hemoglobin level today.    Colon polyp benign not malignant.    Grief reaction at the loss of his wife to overwhelming pneumococcal pneumonia with sepsis.  Discussed at length.  Reassured patient that things will improve with time.  Encouraged exercise.  Patient is an avid golfer.    Time: total time spent with the patient was 25 minutes of which >50% was spent in counseling and coordination of care RTC 1 month.    The following high BMI interventions were performed this visit: encouragement to exercise    Vinh Rdz MD    Patient Active Problem List   Diagnosis     Lumbago     Olecranon Bursitis     Mixed hyperlipidemia     General medical exam     Lower GI bleed     S/P colonoscopic polypectomy with possible polypectomy bleed     Acute blood loss anemia

## 2024-05-14 ENCOUNTER — PATIENT OUTREACH (OUTPATIENT)
Dept: CARE COORDINATION | Facility: CLINIC | Age: 69
End: 2024-05-14
Payer: MEDICARE

## 2024-05-28 ENCOUNTER — PATIENT OUTREACH (OUTPATIENT)
Dept: CARE COORDINATION | Facility: CLINIC | Age: 69
End: 2024-05-28
Payer: MEDICARE

## 2024-06-15 DIAGNOSIS — I10 ESSENTIAL HYPERTENSION: ICD-10-CM

## 2024-06-17 RX ORDER — ROSUVASTATIN CALCIUM 5 MG/1
TABLET, COATED ORAL
Qty: 90 TABLET | Refills: 3 | Status: SHIPPED | OUTPATIENT
Start: 2024-06-17

## 2024-08-11 ENCOUNTER — HEALTH MAINTENANCE LETTER (OUTPATIENT)
Age: 69
End: 2024-08-11